# Patient Record
Sex: FEMALE | Race: WHITE | NOT HISPANIC OR LATINO | URBAN - METROPOLITAN AREA
[De-identification: names, ages, dates, MRNs, and addresses within clinical notes are randomized per-mention and may not be internally consistent; named-entity substitution may affect disease eponyms.]

---

## 2019-03-27 ENCOUNTER — EMERGENCY (EMERGENCY)
Facility: HOSPITAL | Age: 74
LOS: 1 days | Discharge: HOME | End: 2019-03-27
Attending: EMERGENCY MEDICINE | Admitting: EMERGENCY MEDICINE
Payer: COMMERCIAL

## 2019-03-27 VITALS
SYSTOLIC BLOOD PRESSURE: 120 MMHG | RESPIRATION RATE: 18 BRPM | OXYGEN SATURATION: 97 % | DIASTOLIC BLOOD PRESSURE: 60 MMHG | HEART RATE: 72 BPM | TEMPERATURE: 97 F

## 2019-03-27 VITALS
HEIGHT: 65 IN | DIASTOLIC BLOOD PRESSURE: 57 MMHG | SYSTOLIC BLOOD PRESSURE: 149 MMHG | WEIGHT: 212.08 LBS | RESPIRATION RATE: 16 BRPM | HEART RATE: 72 BPM | OXYGEN SATURATION: 98 % | TEMPERATURE: 97 F

## 2019-03-27 LAB
ALBUMIN SERPL ELPH-MCNC: 2.9 G/DL — LOW (ref 3.3–5)
ALP SERPL-CCNC: 62 U/L — SIGNIFICANT CHANGE UP (ref 30–120)
ALT FLD-CCNC: 29 U/L DA — SIGNIFICANT CHANGE UP (ref 10–60)
ANION GAP SERPL CALC-SCNC: 9 MMOL/L — SIGNIFICANT CHANGE UP (ref 5–17)
APTT BLD: 29.1 SEC — SIGNIFICANT CHANGE UP (ref 28.5–37)
AST SERPL-CCNC: 22 U/L — SIGNIFICANT CHANGE UP (ref 10–40)
BASOPHILS # BLD AUTO: 0.02 K/UL — SIGNIFICANT CHANGE UP (ref 0–0.2)
BASOPHILS NFR BLD AUTO: 0.3 % — SIGNIFICANT CHANGE UP (ref 0–2)
BILIRUB SERPL-MCNC: 0.4 MG/DL — SIGNIFICANT CHANGE UP (ref 0.2–1.2)
BUN SERPL-MCNC: 35 MG/DL — HIGH (ref 7–23)
CALCIUM SERPL-MCNC: 8.3 MG/DL — LOW (ref 8.4–10.5)
CHLORIDE SERPL-SCNC: 99 MMOL/L — SIGNIFICANT CHANGE UP (ref 96–108)
CK MB BLD-MCNC: 5.4 % — HIGH (ref 0–3.5)
CK MB CFR SERPL CALC: 6 NG/ML — HIGH (ref 0–3.6)
CK SERPL-CCNC: 111 U/L — SIGNIFICANT CHANGE UP (ref 26–192)
CO2 SERPL-SCNC: 30 MMOL/L — SIGNIFICANT CHANGE UP (ref 22–31)
CREAT SERPL-MCNC: 1.59 MG/DL — HIGH (ref 0.5–1.3)
EOSINOPHIL # BLD AUTO: 0.08 K/UL — SIGNIFICANT CHANGE UP (ref 0–0.5)
EOSINOPHIL NFR BLD AUTO: 1.2 % — SIGNIFICANT CHANGE UP (ref 0–6)
GLUCOSE BLDC GLUCOMTR-MCNC: 135 MG/DL — HIGH (ref 70–99)
GLUCOSE SERPL-MCNC: 169 MG/DL — HIGH (ref 70–99)
HCT VFR BLD CALC: 33.6 % — LOW (ref 34.5–45)
HGB BLD-MCNC: 10.9 G/DL — LOW (ref 11.5–15.5)
IMM GRANULOCYTES NFR BLD AUTO: 1.3 % — SIGNIFICANT CHANGE UP (ref 0–1.5)
INR BLD: 0.97 RATIO — SIGNIFICANT CHANGE UP (ref 0.88–1.16)
LYMPHOCYTES # BLD AUTO: 0.59 K/UL — LOW (ref 1–3.3)
LYMPHOCYTES # BLD AUTO: 8.8 % — LOW (ref 13–44)
MCHC RBC-ENTMCNC: 31.1 PG — SIGNIFICANT CHANGE UP (ref 27–34)
MCHC RBC-ENTMCNC: 32.4 GM/DL — SIGNIFICANT CHANGE UP (ref 32–36)
MCV RBC AUTO: 96 FL — SIGNIFICANT CHANGE UP (ref 80–100)
MONOCYTES # BLD AUTO: 0.71 K/UL — SIGNIFICANT CHANGE UP (ref 0–0.9)
MONOCYTES NFR BLD AUTO: 10.6 % — SIGNIFICANT CHANGE UP (ref 2–14)
NEUTROPHILS # BLD AUTO: 5.18 K/UL — SIGNIFICANT CHANGE UP (ref 1.8–7.4)
NEUTROPHILS NFR BLD AUTO: 77.8 % — HIGH (ref 43–77)
NRBC # BLD: 0 /100 WBCS — SIGNIFICANT CHANGE UP (ref 0–0)
PLATELET # BLD AUTO: 173 K/UL — SIGNIFICANT CHANGE UP (ref 150–400)
POTASSIUM SERPL-MCNC: 3.6 MMOL/L — SIGNIFICANT CHANGE UP (ref 3.5–5.3)
POTASSIUM SERPL-SCNC: 3.6 MMOL/L — SIGNIFICANT CHANGE UP (ref 3.5–5.3)
PROT SERPL-MCNC: 6.1 G/DL — SIGNIFICANT CHANGE UP (ref 6–8.3)
PROTHROM AB SERPL-ACNC: 10.6 SEC — SIGNIFICANT CHANGE UP (ref 10–12.9)
RBC # BLD: 3.5 M/UL — LOW (ref 3.8–5.2)
RBC # FLD: 14.5 % — SIGNIFICANT CHANGE UP (ref 10.3–14.5)
SODIUM SERPL-SCNC: 138 MMOL/L — SIGNIFICANT CHANGE UP (ref 135–145)
TROPONIN I SERPL-MCNC: 0.02 NG/ML — SIGNIFICANT CHANGE UP (ref 0.02–0.06)
WBC # BLD: 6.67 K/UL — SIGNIFICANT CHANGE UP (ref 3.8–10.5)
WBC # FLD AUTO: 6.67 K/UL — SIGNIFICANT CHANGE UP (ref 3.8–10.5)

## 2019-03-27 PROCEDURE — 71045 X-RAY EXAM CHEST 1 VIEW: CPT

## 2019-03-27 PROCEDURE — 70450 CT HEAD/BRAIN W/O DYE: CPT

## 2019-03-27 PROCEDURE — 93010 ELECTROCARDIOGRAM REPORT: CPT

## 2019-03-27 PROCEDURE — 80053 COMPREHEN METABOLIC PANEL: CPT

## 2019-03-27 PROCEDURE — 85027 COMPLETE CBC AUTOMATED: CPT

## 2019-03-27 PROCEDURE — 71045 X-RAY EXAM CHEST 1 VIEW: CPT | Mod: 26

## 2019-03-27 PROCEDURE — 93005 ELECTROCARDIOGRAM TRACING: CPT

## 2019-03-27 PROCEDURE — 85610 PROTHROMBIN TIME: CPT

## 2019-03-27 PROCEDURE — 96374 THER/PROPH/DIAG INJ IV PUSH: CPT

## 2019-03-27 PROCEDURE — 85730 THROMBOPLASTIN TIME PARTIAL: CPT

## 2019-03-27 PROCEDURE — 82962 GLUCOSE BLOOD TEST: CPT

## 2019-03-27 PROCEDURE — 99284 EMERGENCY DEPT VISIT MOD MDM: CPT | Mod: 25

## 2019-03-27 PROCEDURE — 70450 CT HEAD/BRAIN W/O DYE: CPT | Mod: 26

## 2019-03-27 PROCEDURE — 82550 ASSAY OF CK (CPK): CPT

## 2019-03-27 PROCEDURE — 84484 ASSAY OF TROPONIN QUANT: CPT

## 2019-03-27 PROCEDURE — 99284 EMERGENCY DEPT VISIT MOD MDM: CPT

## 2019-03-27 PROCEDURE — 36415 COLL VENOUS BLD VENIPUNCTURE: CPT

## 2019-03-27 PROCEDURE — 82553 CREATINE MB FRACTION: CPT

## 2019-03-27 RX ORDER — ONDANSETRON 8 MG/1
4 TABLET, FILM COATED ORAL ONCE
Qty: 0 | Refills: 0 | Status: COMPLETED | OUTPATIENT
Start: 2019-03-27 | End: 2019-03-27

## 2019-03-27 RX ADMIN — ONDANSETRON 4 MILLIGRAM(S): 8 TABLET, FILM COATED ORAL at 14:05

## 2019-03-27 NOTE — CONSULT NOTE ADULT - PROBLEM SELECTOR RECOMMENDATION 9
Likely secondary to excess fluid removal post-HD  Routine labs  Cardiac enzymes  CT head  Check orthostatics  Cardio consult  Further work-up/management pending clinical course.

## 2019-03-27 NOTE — CONSULT NOTE ADULT - ASSESSMENT
The patient is a 73 year old female with a history of HTN, DM, ESRD on HD who presents with syncope.    Plan:  - Syncope most likely secondary to hypotension from fluid removal during HD  - Patient had received some fluids back at dialysis  - Check orthostatics  - If orthostatics positive, give an additional 250 cc back  - Check basic labs  - Check one set of cardiac enzymes  - CT head ordered

## 2019-03-27 NOTE — ED PROVIDER NOTE - PROGRESS NOTE DETAILS
shala (cards) seen eval pt, cleared for d/c and outpt f/u maximo (neuro) seen eval pt, cleared for d/c and outpt f/u. Reevaluated patient at bedside.  Patient feeling much improved.  Discussed the results of all diagnostic testing in ED and copies of all reports given.   An opportunity to ask questions was given.  Discussed the importance of prompt, close medical follow-up.  Patient will return with any changes, concerns or persistent / worsening symptoms.  Understanding of all instructions verbalized.

## 2019-03-27 NOTE — CONSULT NOTE ADULT - ATTENDING COMMENTS
Advanced care planning discussed with patient/family. Advanced care planning forms reviewed/discussed/completed. 20 minutes spent.

## 2019-03-27 NOTE — ED PROVIDER NOTE - OBJECTIVE STATEMENT
74 y/o F pt w/ PMHx HTN, DM presents to the ED BIBA c/o dizziness, nausea, syncope x 45 minutes. Pt states she was sitting when she became dizzy and passed out while at her dialysis session. Reports that she feels nauseous and describes the dizziness feeling as "tired", denies vomiting. Pt denies abd pain, headache, visual changes, fever, CP, SOB or any other complaints at this time.  Dr. Mendez - nephrologist ([facility])  Dr. Bolden - cardiologist ([facility])  Dr. Bowers - PMD ([facility]) 72 y/o F pt w/ PMHx HTN, DM presents to the ED BIBA c/o dizziness, nausea, syncope x 45 minutes ago. Pt states she was sitting when she became dizzy and passed out while at her dialysis session. Reports that she feels nauseous and describes the dizziness feeling as "tired", denies vomiting. Pt denies abd pain, headache, visual changes, fever, CP, SOB or any other complaints at this time.  Dr. Mendez - nephrologist ([facility])  Dr. Bolden - cardiologist ([facility])  Dr. Bowers - PMD ([facility]) 74 y/o F pt w/ PMHx HTN, DM presents to the ED BIBA c/o dizziness, nausea, syncope x 45 minutes ago. Pt states she was sitting when she became dizzy and passed out while at her dialysis session. Reports that she feels nauseous and describes the dizziness feeling as "tired", denies vomiting. Pt denies abd pain, headache, visual changes, fever, CP, SOB or any other complaints at this time.  Dr. Mendez - nephrologist ([facility])  Dr. Bolden - cardiologist ([facility])  Dr. Bowers - PMD ([facility])  Dr. Fernandez - neuro ([place])

## 2019-03-27 NOTE — CONSULT NOTE ADULT - CONSULT REASON
Patient is being seen and examined in the Emergency Room for possible admission to the hospital. Patient has come with syncope.

## 2019-03-28 DIAGNOSIS — I10 ESSENTIAL (PRIMARY) HYPERTENSION: ICD-10-CM

## 2019-03-28 DIAGNOSIS — R55 SYNCOPE AND COLLAPSE: ICD-10-CM

## 2019-03-28 DIAGNOSIS — N18.6 END STAGE RENAL DISEASE: ICD-10-CM

## 2019-03-28 RX ORDER — LABETALOL HCL 100 MG
0 TABLET ORAL
Qty: 0 | Refills: 0 | COMMUNITY

## 2019-03-28 RX ORDER — AMOXICILLIN 250 MG/5ML
1 SUSPENSION, RECONSTITUTED, ORAL (ML) ORAL
Qty: 0 | Refills: 0 | COMMUNITY

## 2019-03-29 DIAGNOSIS — R55 SYNCOPE AND COLLAPSE: ICD-10-CM

## 2022-04-23 NOTE — CONSULT NOTE ADULT - SUBJECTIVE AND OBJECTIVE BOX
HPI: The patient is a 73 year old female with a history of HTN, DM, ESRD on HD who presents with syncope. She was undergoing dialysis when she started to feel lightheaded, visual changes. She did not say anything because she wanted to finish her session. After session was completed, she lost consciousness briefly. She continues to feel a bit lightheaded and blurry vision with some nausea. No chest pain, palpitations, shortness of breath. She has had near syncope in the past during dialysis when too much fluid is removed.    Dr. Mendez - nephrologist ([facility])  	Dr. Bolden - cardiologist ([facility])  	Dr. Bowers - PMD ([facility])  Dr. Fernandez - neuro ([place])      PAST MEDICAL & SURGICAL HISTORY:  Hypertension  ESRD (end stage renal disease) on dialysis      REVIEW OF SYSTEMS:    CONSTITUTIONAL: No fever, no weight loss, no fatigue  EYES: No eye pain, no visual disturbances  ENMT:  No difficulty hearing, no tinnitus, no vertigo; No sinus or throat pain  NECK: No pain or stiffness  RESPIRATORY: No cough, no wheezing, no chills, no hemoptysis; No shortness of breath  CARDIOVASCULAR: No chest pain, palpitations, dizziness, or leg swelling  GASTROINTESTINAL: No abdominal pain. No nausea, no vomiting, no hematemesis; No diarrhea, no constipation. No melena, no hematochezia.  GENITOURINARY: No dysuria, no frequency, no hematuria, no incontinence  NEUROLOGICAL: No headaches, no memory loss, no loss of strength, no numbness, no tremors  SKIN: No itching, no burning, no rashes   LYMPH NODES: No enlarged glands  ENDOCRINE: No heat, no cold intolerance; No hair loss  MUSCULOSKELETAL: No joint pain, no swelling; No muscle pain, no back pain, no extremity pain  PSYCHIATRIC: No depression, no anxiety, no mood swings, no difficulty sleeping  HEME/LYMPH: No easy bruising, no bleeding gums  ALLERGY AND IMMUNOLOGIC: No hives, no eczema      MEDICATIONS  (STANDING):  calcitriol  labetalol  protonix  renagel  spironolactone    MEDICATIONS  (PRN): denies      Allergies    sulfa drugs (Rash)    Intolerances        SOCIAL HISTORY: no etoh, no smoking    FAMILY HISTORY: no pertinent medical history in first degree relatives      Vital Signs Last 24 Hrs  T(C): 36.3 (27 Mar 2019 16:57), Max: 36.3 (27 Mar 2019 13:39)  T(F): 97.4 (27 Mar 2019 16:57), Max: 97.4 (27 Mar 2019 13:39)  HR: 72 (27 Mar 2019 16:57) (72 - 72)  BP: 120/60 (27 Mar 2019 16:57) (120/60 - 149/57)  BP(mean): --  RR: 18 (27 Mar 2019 16:57) (16 - 18)  SpO2: 97% (27 Mar 2019 16:57) (97% - 98%)    PHYSICAL EXAM:    GENERAL: NAD  HEAD:  Atraumatic, Normocephalic  EYES: EOMI, PERRLA, conjunctiva and sclera clear  ENMT: No tonsillar erythema, exudates, or enlargement; Moist mucous membranes  NECK: Supple, No JVD, Normal thyroid  NERVOUS SYSTEM:  Alert & Oriented X3, Motor Strength 5/5 B/L upper and lower extremities;  CHEST/LUNG: Clear to percussion bilaterally; No rales, rhonchi, wheezing, or rubs  HEART: Regular rate and rhythm; No murmurs, rubs, or gallops  ABDOMEN: Soft, Nontender, Nondistended; Bowel sounds present  EXTREMITIES:  2+ Peripheral Pulses, No clubbing, cyanosis, or edema  LYMPH: No lymphadenopathy   SKIN: No rashes or lesions      LABS:                        10.9   6.67  )-----------( 173      ( 27 Mar 2019 14:11 )             33.6     03-27    138  |  99  |  35<H>  ----------------------------<  169<H>  3.6   |  30  |  1.59<H>    Ca    8.3<L>      27 Mar 2019 14:11    TPro  6.1  /  Alb  2.9<L>  /  TBili  0.4  /  DBili  x   /  AST  22  /  ALT  29  /  AlkPhos  62  03-27    PT/INR - ( 27 Mar 2019 14:11 )   PT: 10.6 sec;   INR: 0.97 ratio         PTT - ( 27 Mar 2019 14:11 )  PTT:29.1 sec      RADIOLOGY & ADDITIONAL STUDIES:
History of Present Illness: The patient is a 73 year old female with a history of HTN, DM, ESRD on HD who presents with syncope. She was undergoing dialysis when she started to feel lightheaded, visual changes. She did not say anything because she wanted to finish her session. After session was completed, she lost consciousness briefly. She continues to feel a bit lightheaded and blurry vision with some nausea. No chest pain, palpitations, shortness of breath. She has had near syncope in the past during dialysis when too much fluid is removed.    Past Medical/Surgical History:  HTN, DM, ESRD on HD    Medications:  Home Medications:      Family History: Non-contributory family history of premature cardiovascular atherosclerotic disease    Social History: No tobacco, alcohol or drug use    Review of Systems:  General: No fevers, chills, weight loss or gain  Skin: No rashes, color changes  Cardiovascular: No chest pain, orthopnea  Respiratory: No shortness of breath, cough  Gastrointestinal: No nausea, abdominal pain  Genitourinary: No incontinence, pain with urination  Musculoskeletal: No pain, swelling, decreased range of motion  Neurological: No headache, weakness  Psychiatric: No depression, anxiety  Endocrine: No weight loss or gain, increased thirst  All other systems are comprehensively negative.    Physical Exam:  Vitals:        Vital Signs Last 24 Hrs  T(C): 36.3 (27 Mar 2019 13:39), Max: 36.3 (27 Mar 2019 13:39)  T(F): 97.4 (27 Mar 2019 13:39), Max: 97.4 (27 Mar 2019 13:39)  HR: 72 (27 Mar 2019 13:39) (72 - 72)  BP: 149/57 (27 Mar 2019 13:39) (149/57 - 149/57)  BP(mean): --  RR: 16 (27 Mar 2019 13:39) (16 - 16)  SpO2: 98% (27 Mar 2019 13:39) (98% - 98%)  General: NAD  HEENT: MMM  Neck: No JVD, no carotid bruit  Lungs: CTAB  CV: RRR, nl S1/S2, no M/R/G  Abdomen: S/NT/ND, +BS  Extremities: No LE edema, no cyanosis  Neuro: AAOx3, non-focal  Skin: No rash    Labs:                  ECG: NSR, normal axis, borderline LVH with secondary repol abnormality
English

## 2023-01-01 ENCOUNTER — TRANSCRIPTION ENCOUNTER (OUTPATIENT)
Age: 78
End: 2023-01-01

## 2023-01-01 ENCOUNTER — EMERGENCY (EMERGENCY)
Facility: HOSPITAL | Age: 78
LOS: 1 days | Discharge: HOME | End: 2023-01-01
Attending: EMERGENCY MEDICINE | Admitting: EMERGENCY MEDICINE
Payer: MEDICARE

## 2023-01-01 ENCOUNTER — INPATIENT (INPATIENT)
Facility: HOSPITAL | Age: 78
LOS: 3 days | Discharge: HOME | DRG: 393 | End: 2023-10-12
Attending: INTERNAL MEDICINE | Admitting: FAMILY MEDICINE
Payer: MEDICARE

## 2023-01-01 ENCOUNTER — INPATIENT (INPATIENT)
Facility: HOSPITAL | Age: 78
LOS: 0 days | Discharge: HOME | DRG: 393 | End: 2023-10-06
Attending: STUDENT IN AN ORGANIZED HEALTH CARE EDUCATION/TRAINING PROGRAM | Admitting: STUDENT IN AN ORGANIZED HEALTH CARE EDUCATION/TRAINING PROGRAM
Payer: COMMERCIAL

## 2023-01-01 ENCOUNTER — EMERGENCY (EMERGENCY)
Facility: HOSPITAL | Age: 78
LOS: 1 days | Discharge: HOME | End: 2023-01-01
Attending: STUDENT IN AN ORGANIZED HEALTH CARE EDUCATION/TRAINING PROGRAM | Admitting: STUDENT IN AN ORGANIZED HEALTH CARE EDUCATION/TRAINING PROGRAM
Payer: MEDICARE

## 2023-01-01 ENCOUNTER — EMERGENCY (EMERGENCY)
Facility: HOSPITAL | Age: 78
LOS: 1 days | Discharge: HOME | End: 2023-01-01
Attending: STUDENT IN AN ORGANIZED HEALTH CARE EDUCATION/TRAINING PROGRAM | Admitting: STUDENT IN AN ORGANIZED HEALTH CARE EDUCATION/TRAINING PROGRAM
Payer: COMMERCIAL

## 2023-01-01 VITALS
SYSTOLIC BLOOD PRESSURE: 154 MMHG | TEMPERATURE: 98 F | OXYGEN SATURATION: 97 % | WEIGHT: 196.21 LBS | RESPIRATION RATE: 16 BRPM | DIASTOLIC BLOOD PRESSURE: 85 MMHG | HEART RATE: 71 BPM

## 2023-01-01 VITALS
HEIGHT: 63 IN | DIASTOLIC BLOOD PRESSURE: 62 MMHG | HEART RATE: 94 BPM | SYSTOLIC BLOOD PRESSURE: 110 MMHG | WEIGHT: 250 LBS | RESPIRATION RATE: 18 BRPM | TEMPERATURE: 97 F | OXYGEN SATURATION: 100 %

## 2023-01-01 VITALS
HEIGHT: 65 IN | TEMPERATURE: 98 F | RESPIRATION RATE: 16 BRPM | WEIGHT: 197.98 LBS | DIASTOLIC BLOOD PRESSURE: 89 MMHG | SYSTOLIC BLOOD PRESSURE: 180 MMHG | HEART RATE: 100 BPM | OXYGEN SATURATION: 100 %

## 2023-01-01 VITALS
DIASTOLIC BLOOD PRESSURE: 89 MMHG | SYSTOLIC BLOOD PRESSURE: 162 MMHG | OXYGEN SATURATION: 96 % | HEART RATE: 94 BPM | RESPIRATION RATE: 16 BRPM

## 2023-01-01 VITALS
OXYGEN SATURATION: 94 % | HEART RATE: 95 BPM | TEMPERATURE: 98 F | RESPIRATION RATE: 16 BRPM | HEIGHT: 65 IN | SYSTOLIC BLOOD PRESSURE: 176 MMHG | DIASTOLIC BLOOD PRESSURE: 82 MMHG | WEIGHT: 197.98 LBS

## 2023-01-01 VITALS
TEMPERATURE: 98 F | OXYGEN SATURATION: 98 % | RESPIRATION RATE: 18 BRPM | DIASTOLIC BLOOD PRESSURE: 80 MMHG | HEART RATE: 79 BPM | SYSTOLIC BLOOD PRESSURE: 175 MMHG

## 2023-01-01 VITALS
TEMPERATURE: 98 F | RESPIRATION RATE: 16 BRPM | HEIGHT: 65 IN | DIASTOLIC BLOOD PRESSURE: 80 MMHG | WEIGHT: 199.96 LBS | HEART RATE: 78 BPM | OXYGEN SATURATION: 96 % | SYSTOLIC BLOOD PRESSURE: 134 MMHG

## 2023-01-01 VITALS
RESPIRATION RATE: 19 BRPM | TEMPERATURE: 97 F | DIASTOLIC BLOOD PRESSURE: 98 MMHG | OXYGEN SATURATION: 95 % | HEIGHT: 65 IN | WEIGHT: 197.98 LBS | HEART RATE: 92 BPM | SYSTOLIC BLOOD PRESSURE: 137 MMHG

## 2023-01-01 VITALS
TEMPERATURE: 98 F | SYSTOLIC BLOOD PRESSURE: 109 MMHG | HEART RATE: 78 BPM | OXYGEN SATURATION: 98 % | DIASTOLIC BLOOD PRESSURE: 69 MMHG | RESPIRATION RATE: 18 BRPM | WEIGHT: 207.01 LBS | HEIGHT: 65 IN

## 2023-01-01 VITALS
OXYGEN SATURATION: 97 % | HEART RATE: 79 BPM | SYSTOLIC BLOOD PRESSURE: 140 MMHG | TEMPERATURE: 99 F | DIASTOLIC BLOOD PRESSURE: 51 MMHG | RESPIRATION RATE: 20 BRPM

## 2023-01-01 DIAGNOSIS — Z93.1 GASTROSTOMY STATUS: Chronic | ICD-10-CM

## 2023-01-01 DIAGNOSIS — Z98.890 OTHER SPECIFIED POSTPROCEDURAL STATES: Chronic | ICD-10-CM

## 2023-01-01 DIAGNOSIS — K94.23 GASTROSTOMY MALFUNCTION: ICD-10-CM

## 2023-01-01 DIAGNOSIS — Z90.49 ACQUIRED ABSENCE OF OTHER SPECIFIED PARTS OF DIGESTIVE TRACT: Chronic | ICD-10-CM

## 2023-01-01 DIAGNOSIS — S99.922A UNSPECIFIED INJURY OF LEFT FOOT, INITIAL ENCOUNTER: ICD-10-CM

## 2023-01-01 DIAGNOSIS — I10 ESSENTIAL (PRIMARY) HYPERTENSION: ICD-10-CM

## 2023-01-01 DIAGNOSIS — Z01.818 ENCOUNTER FOR OTHER PREPROCEDURAL EXAMINATION: ICD-10-CM

## 2023-01-01 DIAGNOSIS — G30.9 ALZHEIMER'S DISEASE, UNSPECIFIED: ICD-10-CM

## 2023-01-01 DIAGNOSIS — N18.6 END STAGE RENAL DISEASE: ICD-10-CM

## 2023-01-01 DIAGNOSIS — E78.5 HYPERLIPIDEMIA, UNSPECIFIED: ICD-10-CM

## 2023-01-01 DIAGNOSIS — T85.528A DISPLACEMENT OF OTHER GASTROINTESTINAL PROSTHETIC DEVICES, IMPLANTS AND GRAFTS, INITIAL ENCOUNTER: ICD-10-CM

## 2023-01-01 DIAGNOSIS — E11.9 TYPE 2 DIABETES MELLITUS WITHOUT COMPLICATIONS: ICD-10-CM

## 2023-01-01 DIAGNOSIS — Z29.9 ENCOUNTER FOR PROPHYLACTIC MEASURES, UNSPECIFIED: ICD-10-CM

## 2023-01-01 LAB
A1C WITH ESTIMATED AVERAGE GLUCOSE RESULT: 5.8 % — HIGH (ref 4–5.6)
ALBUMIN SERPL ELPH-MCNC: 2.8 G/DL — LOW (ref 3.3–5)
ALBUMIN SERPL ELPH-MCNC: 2.9 G/DL — LOW (ref 3.3–5)
ALBUMIN SERPL ELPH-MCNC: 3 G/DL — LOW (ref 3.3–5)
ALBUMIN SERPL ELPH-MCNC: 3.3 G/DL — SIGNIFICANT CHANGE UP (ref 3.3–5)
ALP SERPL-CCNC: 59 U/L — SIGNIFICANT CHANGE UP (ref 40–120)
ALP SERPL-CCNC: 61 U/L — SIGNIFICANT CHANGE UP (ref 40–120)
ALP SERPL-CCNC: 66 U/L — SIGNIFICANT CHANGE UP (ref 40–120)
ALT FLD-CCNC: 10 U/L — SIGNIFICANT CHANGE UP (ref 10–45)
ALT FLD-CCNC: 11 U/L — SIGNIFICANT CHANGE UP (ref 10–45)
ALT FLD-CCNC: 13 U/L — SIGNIFICANT CHANGE UP (ref 10–45)
ALT FLD-CCNC: 15 U/L — SIGNIFICANT CHANGE UP (ref 12–78)
ANION GAP SERPL CALC-SCNC: 10 MMOL/L — SIGNIFICANT CHANGE UP (ref 5–17)
ANION GAP SERPL CALC-SCNC: 11 MMOL/L — SIGNIFICANT CHANGE UP (ref 5–17)
ANION GAP SERPL CALC-SCNC: 5 MMOL/L — SIGNIFICANT CHANGE UP (ref 5–17)
ANION GAP SERPL CALC-SCNC: 7 MMOL/L — SIGNIFICANT CHANGE UP (ref 5–17)
ANION GAP SERPL CALC-SCNC: 8 MMOL/L — SIGNIFICANT CHANGE UP (ref 5–17)
ANION GAP SERPL CALC-SCNC: 9 MMOL/L — SIGNIFICANT CHANGE UP (ref 5–17)
ANION GAP SERPL CALC-SCNC: 9 MMOL/L — SIGNIFICANT CHANGE UP (ref 5–17)
APTT BLD: 25.7 SEC — SIGNIFICANT CHANGE UP (ref 24.5–35.6)
APTT BLD: 32.2 SEC — SIGNIFICANT CHANGE UP (ref 24.5–35.6)
APTT BLD: 33.4 SEC — SIGNIFICANT CHANGE UP (ref 24.5–35.6)
AST SERPL-CCNC: 11 U/L — SIGNIFICANT CHANGE UP (ref 10–40)
AST SERPL-CCNC: 12 U/L — LOW (ref 15–37)
AST SERPL-CCNC: 17 U/L — SIGNIFICANT CHANGE UP (ref 10–40)
AST SERPL-CCNC: 9 U/L — LOW (ref 10–40)
BASOPHILS # BLD AUTO: 0.02 K/UL — SIGNIFICANT CHANGE UP (ref 0–0.2)
BASOPHILS # BLD AUTO: 0.02 K/UL — SIGNIFICANT CHANGE UP (ref 0–0.2)
BASOPHILS # BLD AUTO: 0.04 K/UL — SIGNIFICANT CHANGE UP (ref 0–0.2)
BASOPHILS NFR BLD AUTO: 0.2 % — SIGNIFICANT CHANGE UP (ref 0–2)
BASOPHILS NFR BLD AUTO: 0.2 % — SIGNIFICANT CHANGE UP (ref 0–2)
BASOPHILS NFR BLD AUTO: 0.3 % — SIGNIFICANT CHANGE UP (ref 0–2)
BILIRUB SERPL-MCNC: 0.2 MG/DL — SIGNIFICANT CHANGE UP (ref 0.2–1.2)
BILIRUB SERPL-MCNC: 0.4 MG/DL — SIGNIFICANT CHANGE UP (ref 0.2–1.2)
BLD GP AB SCN SERPL QL: SIGNIFICANT CHANGE UP
BUN SERPL-MCNC: 35 MG/DL — HIGH (ref 7–23)
BUN SERPL-MCNC: 38 MG/DL — HIGH (ref 7–23)
BUN SERPL-MCNC: 41 MG/DL — HIGH (ref 7–23)
BUN SERPL-MCNC: 43 MG/DL — HIGH (ref 7–23)
BUN SERPL-MCNC: 55 MG/DL — HIGH (ref 7–23)
BUN SERPL-MCNC: 67 MG/DL — HIGH (ref 7–23)
BUN SERPL-MCNC: 72 MG/DL — HIGH (ref 7–23)
CALCIUM SERPL-MCNC: 10 MG/DL — SIGNIFICANT CHANGE UP (ref 8.4–10.5)
CALCIUM SERPL-MCNC: 8.9 MG/DL — SIGNIFICANT CHANGE UP (ref 8.4–10.5)
CALCIUM SERPL-MCNC: 9.1 MG/DL — SIGNIFICANT CHANGE UP (ref 8.4–10.5)
CALCIUM SERPL-MCNC: 9.1 MG/DL — SIGNIFICANT CHANGE UP (ref 8.4–10.5)
CALCIUM SERPL-MCNC: 9.3 MG/DL — SIGNIFICANT CHANGE UP (ref 8.4–10.5)
CALCIUM SERPL-MCNC: 9.3 MG/DL — SIGNIFICANT CHANGE UP (ref 8.5–10.1)
CALCIUM SERPL-MCNC: 9.5 MG/DL — SIGNIFICANT CHANGE UP (ref 8.4–10.5)
CHLORIDE SERPL-SCNC: 100 MMOL/L — SIGNIFICANT CHANGE UP (ref 96–108)
CHLORIDE SERPL-SCNC: 100 MMOL/L — SIGNIFICANT CHANGE UP (ref 96–108)
CHLORIDE SERPL-SCNC: 101 MMOL/L — SIGNIFICANT CHANGE UP (ref 96–108)
CHLORIDE SERPL-SCNC: 95 MMOL/L — LOW (ref 96–108)
CHLORIDE SERPL-SCNC: 95 MMOL/L — LOW (ref 96–108)
CHLORIDE SERPL-SCNC: 96 MMOL/L — SIGNIFICANT CHANGE UP (ref 96–108)
CHLORIDE SERPL-SCNC: 98 MMOL/L — SIGNIFICANT CHANGE UP (ref 96–108)
CO2 SERPL-SCNC: 28 MMOL/L — SIGNIFICANT CHANGE UP (ref 22–31)
CO2 SERPL-SCNC: 30 MMOL/L — SIGNIFICANT CHANGE UP (ref 22–31)
CO2 SERPL-SCNC: 31 MMOL/L — SIGNIFICANT CHANGE UP (ref 22–31)
CO2 SERPL-SCNC: 31 MMOL/L — SIGNIFICANT CHANGE UP (ref 22–31)
CO2 SERPL-SCNC: 35 MMOL/L — HIGH (ref 22–31)
CREAT SERPL-MCNC: 1.7 MG/DL — HIGH (ref 0.5–1.3)
CREAT SERPL-MCNC: 1.86 MG/DL — HIGH (ref 0.5–1.3)
CREAT SERPL-MCNC: 2.05 MG/DL — HIGH (ref 0.5–1.3)
CREAT SERPL-MCNC: 2.2 MG/DL — HIGH (ref 0.5–1.3)
CREAT SERPL-MCNC: 2.4 MG/DL — HIGH (ref 0.5–1.3)
CREAT SERPL-MCNC: 2.76 MG/DL — HIGH (ref 0.5–1.3)
CREAT SERPL-MCNC: 2.83 MG/DL — HIGH (ref 0.5–1.3)
CULTURE RESULTS: SIGNIFICANT CHANGE UP
CULTURE RESULTS: SIGNIFICANT CHANGE UP
EGFR: 16 ML/MIN/1.73M2 — LOW
EGFR: 17 ML/MIN/1.73M2 — LOW
EGFR: 20 ML/MIN/1.73M2 — LOW
EGFR: 22 ML/MIN/1.73M2 — LOW
EGFR: 24 ML/MIN/1.73M2 — LOW
EGFR: 27 ML/MIN/1.73M2 — LOW
EGFR: 30 ML/MIN/1.73M2 — LOW
EOSINOPHIL # BLD AUTO: 0.07 K/UL — SIGNIFICANT CHANGE UP (ref 0–0.5)
EOSINOPHIL # BLD AUTO: 0.08 K/UL — SIGNIFICANT CHANGE UP (ref 0–0.5)
EOSINOPHIL # BLD AUTO: 0.11 K/UL — SIGNIFICANT CHANGE UP (ref 0–0.5)
EOSINOPHIL NFR BLD AUTO: 0.8 % — SIGNIFICANT CHANGE UP (ref 0–6)
EOSINOPHIL NFR BLD AUTO: 0.9 % — SIGNIFICANT CHANGE UP (ref 0–6)
EOSINOPHIL NFR BLD AUTO: 1 % — SIGNIFICANT CHANGE UP (ref 0–6)
ESTIMATED AVERAGE GLUCOSE: 120 MG/DL — HIGH (ref 68–114)
GLUCOSE BLDC GLUCOMTR-MCNC: 113 MG/DL — HIGH (ref 70–99)
GLUCOSE BLDC GLUCOMTR-MCNC: 127 MG/DL — HIGH (ref 70–99)
GLUCOSE BLDC GLUCOMTR-MCNC: 147 MG/DL — HIGH (ref 70–99)
GLUCOSE BLDC GLUCOMTR-MCNC: 184 MG/DL — HIGH (ref 70–99)
GLUCOSE BLDC GLUCOMTR-MCNC: 98 MG/DL — SIGNIFICANT CHANGE UP (ref 70–99)
GLUCOSE BLDC GLUCOMTR-MCNC: 99 MG/DL — SIGNIFICANT CHANGE UP (ref 70–99)
GLUCOSE SERPL-MCNC: 109 MG/DL — HIGH (ref 70–99)
GLUCOSE SERPL-MCNC: 111 MG/DL — HIGH (ref 70–99)
GLUCOSE SERPL-MCNC: 117 MG/DL — HIGH (ref 70–99)
GLUCOSE SERPL-MCNC: 132 MG/DL — HIGH (ref 70–99)
GLUCOSE SERPL-MCNC: 135 MG/DL — HIGH (ref 70–99)
GLUCOSE SERPL-MCNC: 138 MG/DL — HIGH (ref 70–99)
GLUCOSE SERPL-MCNC: 143 MG/DL — HIGH (ref 70–99)
HCT VFR BLD CALC: 29.9 % — LOW (ref 34.5–45)
HCT VFR BLD CALC: 30.1 % — LOW (ref 34.5–45)
HCT VFR BLD CALC: 30.1 % — LOW (ref 34.5–45)
HCT VFR BLD CALC: 31 % — LOW (ref 34.5–45)
HCT VFR BLD CALC: 33.8 % — LOW (ref 34.5–45)
HCT VFR BLD CALC: 34.6 % — SIGNIFICANT CHANGE UP (ref 34.5–45)
HCT VFR BLD CALC: 35.9 % — SIGNIFICANT CHANGE UP (ref 34.5–45)
HGB BLD-MCNC: 10 G/DL — LOW (ref 11.5–15.5)
HGB BLD-MCNC: 10.8 G/DL — LOW (ref 11.5–15.5)
HGB BLD-MCNC: 10.9 G/DL — LOW (ref 11.5–15.5)
HGB BLD-MCNC: 11.4 G/DL — LOW (ref 11.5–15.5)
HGB BLD-MCNC: 9.3 G/DL — LOW (ref 11.5–15.5)
HGB BLD-MCNC: 9.7 G/DL — LOW (ref 11.5–15.5)
HGB BLD-MCNC: 9.7 G/DL — LOW (ref 11.5–15.5)
IMM GRANULOCYTES NFR BLD AUTO: 0.6 % — SIGNIFICANT CHANGE UP (ref 0–0.9)
IMM GRANULOCYTES NFR BLD AUTO: 0.7 % — SIGNIFICANT CHANGE UP (ref 0–0.9)
IMM GRANULOCYTES NFR BLD AUTO: 0.8 % — SIGNIFICANT CHANGE UP (ref 0–0.9)
INR BLD: 0.89 RATIO — SIGNIFICANT CHANGE UP (ref 0.85–1.18)
INR BLD: 0.91 RATIO — SIGNIFICANT CHANGE UP (ref 0.85–1.18)
INR BLD: 0.92 RATIO — SIGNIFICANT CHANGE UP (ref 0.85–1.18)
LYMPHOCYTES # BLD AUTO: 0.31 K/UL — LOW (ref 1–3.3)
LYMPHOCYTES # BLD AUTO: 0.47 K/UL — LOW (ref 1–3.3)
LYMPHOCYTES # BLD AUTO: 0.5 K/UL — LOW (ref 1–3.3)
LYMPHOCYTES # BLD AUTO: 3.7 % — LOW (ref 13–44)
LYMPHOCYTES # BLD AUTO: 4.1 % — LOW (ref 13–44)
LYMPHOCYTES # BLD AUTO: 5.3 % — LOW (ref 13–44)
MCHC RBC-ENTMCNC: 29.9 PG — SIGNIFICANT CHANGE UP (ref 27–34)
MCHC RBC-ENTMCNC: 30.4 PG — SIGNIFICANT CHANGE UP (ref 27–34)
MCHC RBC-ENTMCNC: 30.8 PG — SIGNIFICANT CHANGE UP (ref 27–34)
MCHC RBC-ENTMCNC: 30.9 PG — SIGNIFICANT CHANGE UP (ref 27–34)
MCHC RBC-ENTMCNC: 31 PG — SIGNIFICANT CHANGE UP (ref 27–34)
MCHC RBC-ENTMCNC: 31.1 GM/DL — LOW (ref 32–36)
MCHC RBC-ENTMCNC: 31.2 PG — SIGNIFICANT CHANGE UP (ref 27–34)
MCHC RBC-ENTMCNC: 31.5 GM/DL — LOW (ref 32–36)
MCHC RBC-ENTMCNC: 31.8 GM/DL — LOW (ref 32–36)
MCHC RBC-ENTMCNC: 32 GM/DL — SIGNIFICANT CHANGE UP (ref 32–36)
MCHC RBC-ENTMCNC: 32.2 GM/DL — SIGNIFICANT CHANGE UP (ref 32–36)
MCHC RBC-ENTMCNC: 32.2 GM/DL — SIGNIFICANT CHANGE UP (ref 32–36)
MCHC RBC-ENTMCNC: 32.3 GM/DL — SIGNIFICANT CHANGE UP (ref 32–36)
MCV RBC AUTO: 95.4 FL — SIGNIFICANT CHANGE UP (ref 80–100)
MCV RBC AUTO: 95.7 FL — SIGNIFICANT CHANGE UP (ref 80–100)
MCV RBC AUTO: 96.1 FL — SIGNIFICANT CHANGE UP (ref 80–100)
MCV RBC AUTO: 96.2 FL — SIGNIFICANT CHANGE UP (ref 80–100)
MCV RBC AUTO: 96.6 FL — SIGNIFICANT CHANGE UP (ref 80–100)
MCV RBC AUTO: 96.8 FL — SIGNIFICANT CHANGE UP (ref 80–100)
MCV RBC AUTO: 98 FL — SIGNIFICANT CHANGE UP (ref 80–100)
MONOCYTES # BLD AUTO: 0.58 K/UL — SIGNIFICANT CHANGE UP (ref 0–0.9)
MONOCYTES # BLD AUTO: 0.81 K/UL — SIGNIFICANT CHANGE UP (ref 0–0.9)
MONOCYTES # BLD AUTO: 0.96 K/UL — HIGH (ref 0–0.9)
MONOCYTES NFR BLD AUTO: 7 % — SIGNIFICANT CHANGE UP (ref 2–14)
MONOCYTES NFR BLD AUTO: 7.9 % — SIGNIFICANT CHANGE UP (ref 2–14)
MONOCYTES NFR BLD AUTO: 9.1 % — SIGNIFICANT CHANGE UP (ref 2–14)
NEUTROPHILS # BLD AUTO: 10.52 K/UL — HIGH (ref 1.8–7.4)
NEUTROPHILS # BLD AUTO: 7.25 K/UL — SIGNIFICANT CHANGE UP (ref 1.8–7.4)
NEUTROPHILS # BLD AUTO: 7.47 K/UL — HIGH (ref 1.8–7.4)
NEUTROPHILS NFR BLD AUTO: 83.9 % — HIGH (ref 43–77)
NEUTROPHILS NFR BLD AUTO: 86.2 % — HIGH (ref 43–77)
NEUTROPHILS NFR BLD AUTO: 87.3 % — HIGH (ref 43–77)
NRBC # BLD: 0 /100 WBCS — SIGNIFICANT CHANGE UP (ref 0–0)
PLATELET # BLD AUTO: 151 K/UL — SIGNIFICANT CHANGE UP (ref 150–400)
PLATELET # BLD AUTO: 156 K/UL — SIGNIFICANT CHANGE UP (ref 150–400)
PLATELET # BLD AUTO: 158 K/UL — SIGNIFICANT CHANGE UP (ref 150–400)
PLATELET # BLD AUTO: 161 K/UL — SIGNIFICANT CHANGE UP (ref 150–400)
PLATELET # BLD AUTO: 171 K/UL — SIGNIFICANT CHANGE UP (ref 150–400)
PLATELET # BLD AUTO: 177 K/UL — SIGNIFICANT CHANGE UP (ref 150–400)
PLATELET # BLD AUTO: 188 K/UL — SIGNIFICANT CHANGE UP (ref 150–400)
POTASSIUM SERPL-MCNC: 3.4 MMOL/L — LOW (ref 3.5–5.3)
POTASSIUM SERPL-MCNC: 3.8 MMOL/L — SIGNIFICANT CHANGE UP (ref 3.5–5.3)
POTASSIUM SERPL-MCNC: 3.9 MMOL/L — SIGNIFICANT CHANGE UP (ref 3.5–5.3)
POTASSIUM SERPL-MCNC: 4.1 MMOL/L — SIGNIFICANT CHANGE UP (ref 3.5–5.3)
POTASSIUM SERPL-MCNC: 4.2 MMOL/L — SIGNIFICANT CHANGE UP (ref 3.5–5.3)
POTASSIUM SERPL-MCNC: 4.3 MMOL/L — SIGNIFICANT CHANGE UP (ref 3.5–5.3)
POTASSIUM SERPL-SCNC: 3.4 MMOL/L — LOW (ref 3.5–5.3)
POTASSIUM SERPL-SCNC: 3.8 MMOL/L — SIGNIFICANT CHANGE UP (ref 3.5–5.3)
POTASSIUM SERPL-SCNC: 3.9 MMOL/L — SIGNIFICANT CHANGE UP (ref 3.5–5.3)
POTASSIUM SERPL-SCNC: 4.1 MMOL/L — SIGNIFICANT CHANGE UP (ref 3.5–5.3)
POTASSIUM SERPL-SCNC: 4.2 MMOL/L — SIGNIFICANT CHANGE UP (ref 3.5–5.3)
POTASSIUM SERPL-SCNC: 4.3 MMOL/L — SIGNIFICANT CHANGE UP (ref 3.5–5.3)
PROT SERPL-MCNC: 6.6 G/DL — SIGNIFICANT CHANGE UP (ref 6–8.3)
PROT SERPL-MCNC: 6.8 G/DL — SIGNIFICANT CHANGE UP (ref 6–8.3)
PROT SERPL-MCNC: 7.2 G/DL — SIGNIFICANT CHANGE UP (ref 6–8.3)
PROT SERPL-MCNC: 7.4 G/DL — SIGNIFICANT CHANGE UP (ref 6–8.3)
PROTHROM AB SERPL-ACNC: 10.2 SEC — SIGNIFICANT CHANGE UP (ref 9.5–13)
PROTHROM AB SERPL-ACNC: 10.7 SEC — SIGNIFICANT CHANGE UP (ref 9.5–13)
PROTHROM AB SERPL-ACNC: 10.8 SEC — SIGNIFICANT CHANGE UP (ref 9.5–13)
RBC # BLD: 3.11 M/UL — LOW (ref 3.8–5.2)
RBC # BLD: 3.11 M/UL — LOW (ref 3.8–5.2)
RBC # BLD: 3.13 M/UL — LOW (ref 3.8–5.2)
RBC # BLD: 3.25 M/UL — LOW (ref 3.8–5.2)
RBC # BLD: 3.5 M/UL — LOW (ref 3.8–5.2)
RBC # BLD: 3.53 M/UL — LOW (ref 3.8–5.2)
RBC # BLD: 3.75 M/UL — LOW (ref 3.8–5.2)
RBC # FLD: 15.1 % — HIGH (ref 10.3–14.5)
RBC # FLD: 15.1 % — HIGH (ref 10.3–14.5)
RBC # FLD: 15.2 % — HIGH (ref 10.3–14.5)
RBC # FLD: 15.3 % — HIGH (ref 10.3–14.5)
RBC # FLD: 15.6 % — HIGH (ref 10.3–14.5)
RBC # FLD: 15.7 % — HIGH (ref 10.3–14.5)
RBC # FLD: 15.9 % — HIGH (ref 10.3–14.5)
SODIUM SERPL-SCNC: 135 MMOL/L — SIGNIFICANT CHANGE UP (ref 135–145)
SODIUM SERPL-SCNC: 135 MMOL/L — SIGNIFICANT CHANGE UP (ref 135–145)
SODIUM SERPL-SCNC: 136 MMOL/L — SIGNIFICANT CHANGE UP (ref 135–145)
SODIUM SERPL-SCNC: 138 MMOL/L — SIGNIFICANT CHANGE UP (ref 135–145)
SODIUM SERPL-SCNC: 139 MMOL/L — SIGNIFICANT CHANGE UP (ref 135–145)
SODIUM SERPL-SCNC: 140 MMOL/L — SIGNIFICANT CHANGE UP (ref 135–145)
SPECIMEN SOURCE: SIGNIFICANT CHANGE UP
SPECIMEN SOURCE: SIGNIFICANT CHANGE UP
TROPONIN I, HIGH SENSITIVITY RESULT: 26.1 NG/L — SIGNIFICANT CHANGE UP
WBC # BLD: 12.08 K/UL — HIGH (ref 3.8–10.5)
WBC # BLD: 6.54 K/UL — SIGNIFICANT CHANGE UP (ref 3.8–10.5)
WBC # BLD: 7.36 K/UL — SIGNIFICANT CHANGE UP (ref 3.8–10.5)
WBC # BLD: 8.31 K/UL — SIGNIFICANT CHANGE UP (ref 3.8–10.5)
WBC # BLD: 8.59 K/UL — SIGNIFICANT CHANGE UP (ref 3.8–10.5)
WBC # BLD: 8.9 K/UL — SIGNIFICANT CHANGE UP (ref 3.8–10.5)
WBC # FLD AUTO: 12.08 K/UL — HIGH (ref 3.8–10.5)
WBC # FLD AUTO: 6.54 K/UL — SIGNIFICANT CHANGE UP (ref 3.8–10.5)
WBC # FLD AUTO: 7.36 K/UL — SIGNIFICANT CHANGE UP (ref 3.8–10.5)
WBC # FLD AUTO: 8.31 K/UL — SIGNIFICANT CHANGE UP (ref 3.8–10.5)
WBC # FLD AUTO: 8.59 K/UL — SIGNIFICANT CHANGE UP (ref 3.8–10.5)
WBC # FLD AUTO: 8.9 K/UL — SIGNIFICANT CHANGE UP (ref 3.8–10.5)

## 2023-01-01 PROCEDURE — 73630 X-RAY EXAM OF FOOT: CPT | Mod: 26,LT

## 2023-01-01 PROCEDURE — 99261: CPT

## 2023-01-01 PROCEDURE — 73610 X-RAY EXAM OF ANKLE: CPT

## 2023-01-01 PROCEDURE — 93005 ELECTROCARDIOGRAM TRACING: CPT

## 2023-01-01 PROCEDURE — 99497 ADVNCD CARE PLAN 30 MIN: CPT | Mod: 25

## 2023-01-01 PROCEDURE — 85027 COMPLETE CBC AUTOMATED: CPT

## 2023-01-01 PROCEDURE — 29515 APPLICATION SHORT LEG SPLINT: CPT

## 2023-01-01 PROCEDURE — 71045 X-RAY EXAM CHEST 1 VIEW: CPT

## 2023-01-01 PROCEDURE — 99285 EMERGENCY DEPT VISIT HI MDM: CPT | Mod: FS,57

## 2023-01-01 PROCEDURE — 99223 1ST HOSP IP/OBS HIGH 75: CPT

## 2023-01-01 PROCEDURE — 86901 BLOOD TYPING SEROLOGIC RH(D): CPT

## 2023-01-01 PROCEDURE — L8699: CPT

## 2023-01-01 PROCEDURE — 93010 ELECTROCARDIOGRAM REPORT: CPT

## 2023-01-01 PROCEDURE — 82962 GLUCOSE BLOOD TEST: CPT

## 2023-01-01 PROCEDURE — 85610 PROTHROMBIN TIME: CPT

## 2023-01-01 PROCEDURE — 99233 SBSQ HOSP IP/OBS HIGH 50: CPT | Mod: FS

## 2023-01-01 PROCEDURE — 86850 RBC ANTIBODY SCREEN: CPT

## 2023-01-01 PROCEDURE — 85025 COMPLETE CBC W/AUTO DIFF WBC: CPT

## 2023-01-01 PROCEDURE — 74018 RADEX ABDOMEN 1 VIEW: CPT

## 2023-01-01 PROCEDURE — 84484 ASSAY OF TROPONIN QUANT: CPT

## 2023-01-01 PROCEDURE — 36415 COLL VENOUS BLD VENIPUNCTURE: CPT

## 2023-01-01 PROCEDURE — 96374 THER/PROPH/DIAG INJ IV PUSH: CPT

## 2023-01-01 PROCEDURE — 49465 FLUORO EXAM OF G/COLON TUBE: CPT

## 2023-01-01 PROCEDURE — 99239 HOSP IP/OBS DSCHRG MGMT >30: CPT

## 2023-01-01 PROCEDURE — 43762 RPLC GTUBE NO REVJ TRC: CPT

## 2023-01-01 PROCEDURE — 73620 X-RAY EXAM OF FOOT: CPT | Mod: 26,LT

## 2023-01-01 PROCEDURE — 99284 EMERGENCY DEPT VISIT MOD MDM: CPT | Mod: 25

## 2023-01-01 PROCEDURE — 28470 CLTX METATARSAL FX WO MNP EA: CPT | Mod: 54,T2,T3

## 2023-01-01 PROCEDURE — 99285 EMERGENCY DEPT VISIT HI MDM: CPT | Mod: 25

## 2023-01-01 PROCEDURE — 80048 BASIC METABOLIC PNL TOTAL CA: CPT

## 2023-01-01 PROCEDURE — 83036 HEMOGLOBIN GLYCOSYLATED A1C: CPT

## 2023-01-01 PROCEDURE — 99232 SBSQ HOSP IP/OBS MODERATE 35: CPT

## 2023-01-01 PROCEDURE — 85730 THROMBOPLASTIN TIME PARTIAL: CPT

## 2023-01-01 PROCEDURE — 73610 X-RAY EXAM OF ANKLE: CPT | Mod: 26,LT

## 2023-01-01 PROCEDURE — 87040 BLOOD CULTURE FOR BACTERIA: CPT

## 2023-01-01 PROCEDURE — 73630 X-RAY EXAM OF FOOT: CPT

## 2023-01-01 PROCEDURE — 43246 EGD PLACE GASTROSTOMY TUBE: CPT

## 2023-01-01 PROCEDURE — 99285 EMERGENCY DEPT VISIT HI MDM: CPT | Mod: FS

## 2023-01-01 PROCEDURE — 99282 EMERGENCY DEPT VISIT SF MDM: CPT

## 2023-01-01 PROCEDURE — 71045 X-RAY EXAM CHEST 1 VIEW: CPT | Mod: 26

## 2023-01-01 PROCEDURE — 74018 RADEX ABDOMEN 1 VIEW: CPT | Mod: 26

## 2023-01-01 PROCEDURE — 80053 COMPREHEN METABOLIC PANEL: CPT

## 2023-01-01 PROCEDURE — 99223 1ST HOSP IP/OBS HIGH 75: CPT | Mod: FS

## 2023-01-01 PROCEDURE — 99232 SBSQ HOSP IP/OBS MODERATE 35: CPT | Mod: FS

## 2023-01-01 PROCEDURE — 86900 BLOOD TYPING SEROLOGIC ABO: CPT

## 2023-01-01 PROCEDURE — 99283 EMERGENCY DEPT VISIT LOW MDM: CPT

## 2023-01-01 PROCEDURE — 90935 HEMODIALYSIS ONE EVALUATION: CPT

## 2023-01-01 PROCEDURE — 99285 EMERGENCY DEPT VISIT HI MDM: CPT

## 2023-01-01 PROCEDURE — 73620 X-RAY EXAM OF FOOT: CPT

## 2023-01-01 DEVICE — PEG KT SAFETY 20FR: Type: IMPLANTABLE DEVICE | Status: FUNCTIONAL

## 2023-01-01 RX ORDER — DIAZEPAM 5 MG
5 TABLET ORAL
Refills: 0 | Status: DISCONTINUED | OUTPATIENT
Start: 2023-01-01 | End: 2023-01-01

## 2023-01-01 RX ORDER — ATORVASTATIN CALCIUM 80 MG/1
10 TABLET, FILM COATED ORAL AT BEDTIME
Refills: 0 | Status: DISCONTINUED | OUTPATIENT
Start: 2023-01-01 | End: 2023-01-01

## 2023-01-01 RX ORDER — ERYTHROPOIETIN 10000 [IU]/ML
10000 INJECTION, SOLUTION INTRAVENOUS; SUBCUTANEOUS
Refills: 0 | Status: DISCONTINUED | OUTPATIENT
Start: 2023-01-01 | End: 2023-01-01

## 2023-01-01 RX ORDER — CEFTRIAXONE 500 MG/1
INJECTION, POWDER, FOR SOLUTION INTRAMUSCULAR; INTRAVENOUS
Refills: 0 | Status: DISCONTINUED | OUTPATIENT
Start: 2023-01-01 | End: 2023-01-01

## 2023-01-01 RX ORDER — CLOPIDOGREL BISULFATE 75 MG/1
75 TABLET, FILM COATED ORAL DAILY
Refills: 0 | Status: DISCONTINUED | OUTPATIENT
Start: 2023-01-01 | End: 2023-01-01

## 2023-01-01 RX ORDER — HEPARIN SODIUM 5000 [USP'U]/ML
5000 INJECTION INTRAVENOUS; SUBCUTANEOUS EVERY 12 HOURS
Refills: 0 | Status: DISCONTINUED | OUTPATIENT
Start: 2023-01-01 | End: 2023-01-01

## 2023-01-01 RX ORDER — GENTAMICIN SULFATE 40 MG/ML
100 VIAL (ML) INJECTION ONCE
Refills: 0 | Status: DISCONTINUED | OUTPATIENT
Start: 2023-01-01 | End: 2023-01-01

## 2023-01-01 RX ORDER — SODIUM CHLORIDE 9 MG/ML
1000 INJECTION, SOLUTION INTRAVENOUS
Refills: 0 | Status: DISCONTINUED | OUTPATIENT
Start: 2023-01-01 | End: 2023-01-01

## 2023-01-01 RX ORDER — POTASSIUM CHLORIDE 20 MEQ
40 PACKET (EA) ORAL ONCE
Refills: 0 | Status: COMPLETED | OUTPATIENT
Start: 2023-01-01 | End: 2023-01-01

## 2023-01-01 RX ORDER — SENNA PLUS 8.6 MG/1
2 TABLET ORAL AT BEDTIME
Refills: 0 | Status: DISCONTINUED | OUTPATIENT
Start: 2023-01-01 | End: 2023-01-01

## 2023-01-01 RX ORDER — SEVELAMER CARBONATE 2400 MG/1
0 POWDER, FOR SUSPENSION ORAL
Qty: 0 | Refills: 0 | DISCHARGE

## 2023-01-01 RX ORDER — SODIUM CHLORIDE 9 MG/ML
500 INJECTION INTRAMUSCULAR; INTRAVENOUS; SUBCUTANEOUS ONCE
Refills: 0 | Status: COMPLETED | OUTPATIENT
Start: 2023-01-01 | End: 2023-01-01

## 2023-01-01 RX ORDER — FAMOTIDINE 10 MG/ML
20 INJECTION INTRAVENOUS DAILY
Refills: 0 | Status: DISCONTINUED | OUTPATIENT
Start: 2023-01-01 | End: 2023-01-01

## 2023-01-01 RX ORDER — CEFTRIAXONE 500 MG/1
1000 INJECTION, POWDER, FOR SOLUTION INTRAMUSCULAR; INTRAVENOUS ONCE
Refills: 0 | Status: COMPLETED | OUTPATIENT
Start: 2023-01-01 | End: 2023-01-01

## 2023-01-01 RX ORDER — CEFTRIAXONE 500 MG/1
1000 INJECTION, POWDER, FOR SOLUTION INTRAMUSCULAR; INTRAVENOUS EVERY 24 HOURS
Refills: 0 | Status: DISCONTINUED | OUTPATIENT
Start: 2023-01-01 | End: 2023-01-01

## 2023-01-01 RX ORDER — QUETIAPINE FUMARATE 200 MG/1
25 TABLET, FILM COATED ORAL DAILY
Refills: 0 | Status: DISCONTINUED | OUTPATIENT
Start: 2023-01-01 | End: 2023-01-01

## 2023-01-01 RX ORDER — POLYETHYLENE GLYCOL 3350 17 G/17G
17 POWDER, FOR SOLUTION ORAL DAILY
Refills: 0 | Status: DISCONTINUED | OUTPATIENT
Start: 2023-01-01 | End: 2023-01-01

## 2023-01-01 RX ORDER — ACETAMINOPHEN 500 MG
1000 TABLET ORAL ONCE
Refills: 0 | Status: COMPLETED | OUTPATIENT
Start: 2023-01-01 | End: 2023-01-01

## 2023-01-01 RX ORDER — CHLORHEXIDINE GLUCONATE 213 G/1000ML
1 SOLUTION TOPICAL ONCE
Refills: 0 | Status: COMPLETED | OUTPATIENT
Start: 2023-01-01 | End: 2023-01-01

## 2023-01-01 RX ORDER — CHOLECALCIFEROL (VITAMIN D3) 125 MCG
1000 CAPSULE ORAL DAILY
Refills: 0 | Status: DISCONTINUED | OUTPATIENT
Start: 2023-01-01 | End: 2023-01-01

## 2023-01-01 RX ORDER — VANCOMYCIN HCL 1 G
1000 VIAL (EA) INTRAVENOUS ONCE
Refills: 0 | Status: DISCONTINUED | OUTPATIENT
Start: 2023-01-01 | End: 2023-01-01

## 2023-01-01 RX ORDER — ACETAMINOPHEN 500 MG
650 TABLET ORAL ONCE
Refills: 0 | Status: DISCONTINUED | OUTPATIENT
Start: 2023-01-01 | End: 2023-01-01

## 2023-01-01 RX ORDER — DEXTROSE 50 % IN WATER 50 %
25 SYRINGE (ML) INTRAVENOUS ONCE
Refills: 0 | Status: DISCONTINUED | OUTPATIENT
Start: 2023-01-01 | End: 2023-01-01

## 2023-01-01 RX ORDER — INSULIN LISPRO 100/ML
VIAL (ML) SUBCUTANEOUS EVERY 6 HOURS
Refills: 0 | Status: DISCONTINUED | OUTPATIENT
Start: 2023-01-01 | End: 2023-01-01

## 2023-01-01 RX ORDER — CALCITRIOL 0.5 UG/1
0 CAPSULE ORAL
Qty: 0 | Refills: 0 | DISCHARGE

## 2023-01-01 RX ORDER — AMLODIPINE BESYLATE 2.5 MG/1
2.5 TABLET ORAL DAILY
Refills: 0 | Status: DISCONTINUED | OUTPATIENT
Start: 2023-01-01 | End: 2023-01-01

## 2023-01-01 RX ORDER — SERTRALINE 25 MG/1
100 TABLET, FILM COATED ORAL
Refills: 0 | Status: DISCONTINUED | OUTPATIENT
Start: 2023-01-01 | End: 2023-01-01

## 2023-01-01 RX ORDER — DEXTROSE 50 % IN WATER 50 %
15 SYRINGE (ML) INTRAVENOUS ONCE
Refills: 0 | Status: DISCONTINUED | OUTPATIENT
Start: 2023-01-01 | End: 2023-01-01

## 2023-01-01 RX ORDER — GENTAMICIN SULFATE 40 MG/ML
100 VIAL (ML) INJECTION ONCE
Refills: 0 | Status: COMPLETED | OUTPATIENT
Start: 2023-01-01 | End: 2023-01-01

## 2023-01-01 RX ORDER — SPIRONOLACTONE 25 MG/1
1 TABLET, FILM COATED ORAL
Qty: 0 | Refills: 0 | DISCHARGE

## 2023-01-01 RX ORDER — LIDOCAINE AND PRILOCAINE CREAM 25; 25 MG/G; MG/G
1 CREAM TOPICAL
Refills: 0 | Status: DISCONTINUED | OUTPATIENT
Start: 2023-01-01 | End: 2023-01-01

## 2023-01-01 RX ORDER — HEPARIN SODIUM 5000 [USP'U]/ML
5000 INJECTION INTRAVENOUS; SUBCUTANEOUS EVERY 8 HOURS
Refills: 0 | Status: DISCONTINUED | OUTPATIENT
Start: 2023-01-01 | End: 2023-01-01

## 2023-01-01 RX ORDER — CEFTRIAXONE 500 MG/1
1000 INJECTION, POWDER, FOR SOLUTION INTRAMUSCULAR; INTRAVENOUS ONCE
Refills: 0 | Status: DISCONTINUED | OUTPATIENT
Start: 2023-01-01 | End: 2023-01-01

## 2023-01-01 RX ORDER — DEXTROSE 50 % IN WATER 50 %
12.5 SYRINGE (ML) INTRAVENOUS ONCE
Refills: 0 | Status: DISCONTINUED | OUTPATIENT
Start: 2023-01-01 | End: 2023-01-01

## 2023-01-01 RX ORDER — VANCOMYCIN HCL 1 G
1000 VIAL (EA) INTRAVENOUS ONCE
Refills: 0 | Status: COMPLETED | OUTPATIENT
Start: 2023-01-01 | End: 2023-01-01

## 2023-01-01 RX ORDER — CEFTRIAXONE 500 MG/1
1000 INJECTION, POWDER, FOR SOLUTION INTRAMUSCULAR; INTRAVENOUS EVERY 24 HOURS
Refills: 0 | Status: COMPLETED | OUTPATIENT
Start: 2023-01-01 | End: 2023-01-01

## 2023-01-01 RX ORDER — PANTOPRAZOLE SODIUM 20 MG/1
1 TABLET, DELAYED RELEASE ORAL
Qty: 0 | Refills: 0 | DISCHARGE

## 2023-01-01 RX ORDER — GLUCAGON INJECTION, SOLUTION 0.5 MG/.1ML
1 INJECTION, SOLUTION SUBCUTANEOUS ONCE
Refills: 0 | Status: DISCONTINUED | OUTPATIENT
Start: 2023-01-01 | End: 2023-01-01

## 2023-01-01 RX ORDER — LABETALOL HCL 100 MG
0 TABLET ORAL
Qty: 0 | Refills: 0 | DISCHARGE

## 2023-01-01 RX ORDER — CEPHALEXIN 500 MG
1 CAPSULE ORAL
Qty: 10 | Refills: 0
Start: 2023-01-01 | End: 2023-01-01

## 2023-01-01 RX ORDER — POLYETHYLENE GLYCOL 3350 17 G/17G
17 POWDER, FOR SOLUTION ORAL
Refills: 0 | Status: DISCONTINUED | OUTPATIENT
Start: 2023-01-01 | End: 2023-01-01

## 2023-01-01 RX ADMIN — SERTRALINE 100 MILLIGRAM(S): 25 TABLET, FILM COATED ORAL at 05:15

## 2023-01-01 RX ADMIN — SERTRALINE 100 MILLIGRAM(S): 25 TABLET, FILM COATED ORAL at 19:17

## 2023-01-01 RX ADMIN — HEPARIN SODIUM 5000 UNIT(S): 5000 INJECTION INTRAVENOUS; SUBCUTANEOUS at 05:04

## 2023-01-01 RX ADMIN — ATORVASTATIN CALCIUM 10 MILLIGRAM(S): 80 TABLET, FILM COATED ORAL at 21:07

## 2023-01-01 RX ADMIN — CEFTRIAXONE 1000 MILLIGRAM(S): 500 INJECTION, POWDER, FOR SOLUTION INTRAMUSCULAR; INTRAVENOUS at 16:48

## 2023-01-01 RX ADMIN — QUETIAPINE FUMARATE 25 MILLIGRAM(S): 200 TABLET, FILM COATED ORAL at 11:43

## 2023-01-01 RX ADMIN — CLOPIDOGREL BISULFATE 75 MILLIGRAM(S): 75 TABLET, FILM COATED ORAL at 11:43

## 2023-01-01 RX ADMIN — SODIUM CHLORIDE 75 MILLILITER(S): 9 INJECTION, SOLUTION INTRAVENOUS at 18:04

## 2023-01-01 RX ADMIN — ATORVASTATIN CALCIUM 10 MILLIGRAM(S): 80 TABLET, FILM COATED ORAL at 00:25

## 2023-01-01 RX ADMIN — Medication 200 MILLIGRAM(S): at 14:00

## 2023-01-01 RX ADMIN — FAMOTIDINE 100 MILLIGRAM(S): 10 INJECTION INTRAVENOUS at 17:59

## 2023-01-01 RX ADMIN — Medication 5 MILLIGRAM(S): at 19:18

## 2023-01-01 RX ADMIN — CHLORHEXIDINE GLUCONATE 1 APPLICATION(S): 213 SOLUTION TOPICAL at 08:18

## 2023-01-01 RX ADMIN — HEPARIN SODIUM 5000 UNIT(S): 5000 INJECTION INTRAVENOUS; SUBCUTANEOUS at 21:48

## 2023-01-01 RX ADMIN — HEPARIN SODIUM 5000 UNIT(S): 5000 INJECTION INTRAVENOUS; SUBCUTANEOUS at 17:43

## 2023-01-01 RX ADMIN — HEPARIN SODIUM 5000 UNIT(S): 5000 INJECTION INTRAVENOUS; SUBCUTANEOUS at 17:28

## 2023-01-01 RX ADMIN — Medication 5 MILLIGRAM(S): at 21:48

## 2023-01-01 RX ADMIN — HEPARIN SODIUM 5000 UNIT(S): 5000 INJECTION INTRAVENOUS; SUBCUTANEOUS at 05:08

## 2023-01-01 RX ADMIN — Medication 5 MILLIGRAM(S): at 06:13

## 2023-01-01 RX ADMIN — Medication 400 MILLIGRAM(S): at 20:59

## 2023-01-01 RX ADMIN — POLYETHYLENE GLYCOL 3350 17 GRAM(S): 17 POWDER, FOR SOLUTION ORAL at 22:51

## 2023-01-01 RX ADMIN — CEFTRIAXONE 100 MILLIGRAM(S): 500 INJECTION, POWDER, FOR SOLUTION INTRAMUSCULAR; INTRAVENOUS at 16:30

## 2023-01-01 RX ADMIN — Medication 5 MILLIGRAM(S): at 21:13

## 2023-01-01 RX ADMIN — Medication 2: at 12:56

## 2023-01-01 RX ADMIN — SODIUM CHLORIDE 50 MILLILITER(S): 9 INJECTION, SOLUTION INTRAVENOUS at 21:35

## 2023-01-01 RX ADMIN — FAMOTIDINE 20 MILLIGRAM(S): 10 INJECTION INTRAVENOUS at 13:02

## 2023-01-01 RX ADMIN — HEPARIN SODIUM 5000 UNIT(S): 5000 INJECTION INTRAVENOUS; SUBCUTANEOUS at 00:25

## 2023-01-01 RX ADMIN — Medication 1000 MILLIGRAM(S): at 21:29

## 2023-01-01 RX ADMIN — QUETIAPINE FUMARATE 25 MILLIGRAM(S): 200 TABLET, FILM COATED ORAL at 13:02

## 2023-01-01 RX ADMIN — LIDOCAINE AND PRILOCAINE CREAM 1 APPLICATION(S): 25; 25 CREAM TOPICAL at 11:43

## 2023-01-01 RX ADMIN — CEFTRIAXONE 1000 MILLIGRAM(S): 500 INJECTION, POWDER, FOR SOLUTION INTRAMUSCULAR; INTRAVENOUS at 16:41

## 2023-01-01 RX ADMIN — Medication 5 MILLIGRAM(S): at 05:08

## 2023-01-01 RX ADMIN — CLOPIDOGREL BISULFATE 75 MILLIGRAM(S): 75 TABLET, FILM COATED ORAL at 13:02

## 2023-01-01 RX ADMIN — FAMOTIDINE 100 MILLIGRAM(S): 10 INJECTION INTRAVENOUS at 12:15

## 2023-01-01 RX ADMIN — AMLODIPINE BESYLATE 2.5 MILLIGRAM(S): 2.5 TABLET ORAL at 05:08

## 2023-01-01 RX ADMIN — SENNA PLUS 2 TABLET(S): 8.6 TABLET ORAL at 22:51

## 2023-01-01 RX ADMIN — AMLODIPINE BESYLATE 2.5 MILLIGRAM(S): 2.5 TABLET ORAL at 05:54

## 2023-01-01 RX ADMIN — HEPARIN SODIUM 5000 UNIT(S): 5000 INJECTION INTRAVENOUS; SUBCUTANEOUS at 13:02

## 2023-01-01 RX ADMIN — Medication 5 MILLIGRAM(S): at 05:53

## 2023-01-01 RX ADMIN — SERTRALINE 100 MILLIGRAM(S): 25 TABLET, FILM COATED ORAL at 17:43

## 2023-01-01 RX ADMIN — QUETIAPINE FUMARATE 25 MILLIGRAM(S): 200 TABLET, FILM COATED ORAL at 11:46

## 2023-01-01 RX ADMIN — HEPARIN SODIUM 5000 UNIT(S): 5000 INJECTION INTRAVENOUS; SUBCUTANEOUS at 17:23

## 2023-01-01 RX ADMIN — ERYTHROPOIETIN 10000 UNIT(S): 10000 INJECTION, SOLUTION INTRAVENOUS; SUBCUTANEOUS at 15:01

## 2023-01-01 RX ADMIN — HEPARIN SODIUM 5000 UNIT(S): 5000 INJECTION INTRAVENOUS; SUBCUTANEOUS at 05:20

## 2023-01-01 RX ADMIN — SERTRALINE 100 MILLIGRAM(S): 25 TABLET, FILM COATED ORAL at 05:53

## 2023-01-01 RX ADMIN — POLYETHYLENE GLYCOL 3350 17 GRAM(S): 17 POWDER, FOR SOLUTION ORAL at 05:08

## 2023-01-01 RX ADMIN — Medication 40 MILLIEQUIVALENT(S): at 11:46

## 2023-01-01 RX ADMIN — Medication 5 MILLIGRAM(S): at 05:04

## 2023-01-01 RX ADMIN — Medication 5 MILLIGRAM(S): at 17:42

## 2023-01-01 RX ADMIN — Medication 250 MILLIGRAM(S): at 14:30

## 2023-01-01 RX ADMIN — AMLODIPINE BESYLATE 2.5 MILLIGRAM(S): 2.5 TABLET ORAL at 11:43

## 2023-01-01 RX ADMIN — Medication 1000 UNIT(S): at 13:01

## 2023-01-01 RX ADMIN — CLOPIDOGREL BISULFATE 75 MILLIGRAM(S): 75 TABLET, FILM COATED ORAL at 11:46

## 2023-01-01 RX ADMIN — FAMOTIDINE 100 MILLIGRAM(S): 10 INJECTION INTRAVENOUS at 12:28

## 2023-01-01 RX ADMIN — HEPARIN SODIUM 5000 UNIT(S): 5000 INJECTION INTRAVENOUS; SUBCUTANEOUS at 05:54

## 2023-01-01 RX ADMIN — HEPARIN SODIUM 5000 UNIT(S): 5000 INJECTION INTRAVENOUS; SUBCUTANEOUS at 06:13

## 2023-05-10 NOTE — ED ADULT NURSE NOTE - OBJECTIVE STATEMENT
Pt BIBA from home, with c/o dislodged g-tube.  Per spouse at bedside, reports dislodged g-tube today.  20 [place] g-tube brought in by .  Denies any further complaints.

## 2023-05-10 NOTE — ED PROVIDER NOTE - PATIENT PORTAL LINK FT
You can access the FollowMyHealth Patient Portal offered by [facility] by registering at the following website: [email or web address]. By joining [employer]’s FollowMyHealth portal, you will also be able to view your health information using other applications (apps) compatible with our system.

## 2023-05-10 NOTE — ED PROVIDER NOTE - NSFOLLOWUPINSTRUCTIONS_ED_ALL_ED_FT
flush tube with water after each feeding  keep skin ostium clean and dry   if any excoriation apply bacitracin daily

## 2023-05-10 NOTE — ED PROVIDER NOTE - PHYSICAL EXAMINATION
General:     NAD, well-nourished, well-appearing  Head:     NC/AT, EOMI, oral mucosa moist  Neck:     supple  Lungs:     CTA b/l, no w/r/r  CVS:     S1S2, RRR, no m/g/r  Abd:     +BS, s/nt/nd, no organomegaly, feeding tube site clead , no sign of infection  Ext:    2+ radial and pedal pulses, no c/c/e  Neuro: grossly intact

## 2023-05-10 NOTE — ED ADULT NURSE NOTE - NSFALLRISKINTERV_ED_ALL_ED

## 2023-05-11 PROBLEM — I10 ESSENTIAL (PRIMARY) HYPERTENSION: Chronic | Status: ACTIVE | Noted: 2019-03-27

## 2023-05-11 PROBLEM — N18.6 END STAGE RENAL DISEASE: Chronic | Status: ACTIVE | Noted: 2019-03-27

## 2023-07-03 NOTE — ED PROVIDER NOTE - NSICDXPASTMEDICALHX_GEN_ALL_CORE_FT
PAST MEDICAL HISTORY:  CVA (cerebrovascular accident)     ESRD (end stage renal disease) on dialysis     Hypertension

## 2023-07-03 NOTE — ED ADULT NURSE NOTE - NSFALLRISKINTERV_ED_ALL_ED

## 2023-07-03 NOTE — ED PROVIDER NOTE - OBJECTIVE STATEMENT
77F with PMhx HTN, DM, CVA c/w esophageal paralysis s/p g-tube presents for g-tube malfunction. pt's aide states she was trying to use g-tube but constantly refluxes back out. last g-tube use was last night. pt denies fevers/chills, pain, nausea/vomiting. pt states she was in ED 2 months ago and had g-tube replaced for reflux of blood in g-tube. 77F with PMhx HTN, DM, CKD on HD M/W/F, CVA c/w esophageal paralysis s/p g-tube presents for g-tube malfunction. pt's aide states she was trying to use g-tube but constantly refluxes back out. last g-tube use was last night. pt denies fevers/chills, pain, nausea/vomiting. pt states she was in ED 2 months ago and had g-tube replaced for reflux of blood in g-tube.

## 2023-07-03 NOTE — ED PROVIDER NOTE - CLINICAL SUMMARY MEDICAL DECISION MAKING FREE TEXT BOX
77F with PMhx HTN, DM, CKD on HD M/W/F, CVA c/w esophageal paralysis s/p g-tube presents for g-tube malfunction. pt's aide states she was trying to use g-tube but constantly refluxes back out. last g-tube use was last night. pt denies fevers/chills, pain, nausea/vomiting. pt states she was in ED 2 months ago and had g-tube replaced for reflux of blood in g-tube.    Tube removed and placed anew. Xray confirms. Pt darrius well. Worsening, continued or ANY new concerning symptoms return to the emergency department.

## 2023-07-03 NOTE — ED PROVIDER NOTE - ATTENDING CONTRIBUTION TO CARE
Jeb with Resident Dr Aguilar. 77F with PMhx HTN, DM, CKD on HD M/W/F, CVA c/w esophageal paralysis s/p g-tube presents for g-tube malfunction. pt's aide states she was trying to use g-tube but constantly refluxes back out. last g-tube use was last night. pt denies fevers/chills, pain, nausea/vomiting. pt states she was in ED 2 months ago and had g-tube replaced for reflux of blood in g-tube.    New 18Fr tube placed. Pt darrius well.     I performed a face to face bedside interview with patient regarding history of present illness, review of symptoms and past medical history. I completed an independent physical exam.  I have discussed the patient's plan of care with Resident. I agree with note as stated above, having amended the EMR as needed to reflect my findings.   This includes History of Present Illness, HIV, Past Medical/Surgical/Family/Social History, Allergies and Home Medications, Review of Systems, Physical Exam, and any Progress Notes during the time I functioned as the attending physician for this patient.

## 2023-07-03 NOTE — ED PROVIDER NOTE - PHYSICAL EXAMINATION
Vitals  T(F): 97.6 (07-03-23 @ 10:34), Max: 97.6 (07-03-23 @ 10:34)  HR: 78 (07-03-23 @ 10:34) (78 - 78)  BP: 134/80 (07-03-23 @ 10:34) (134/80 - 134/80)  RR: 16 (07-03-23 @ 10:34) (16 - 16)  SpO2: 96% (07-03-23 @ 10:34) (96% - 96%)    PHYSICAL EXAM   Gen: NAD, comfortable, AA&Ox3. wheelchair bound   HEENT: head atrumatic and normocephalic, PEERLA, EOMI,  no gross abnormalities of ears, mucous membranes moist, no oral lesions, neck supple without masses/goiter/lymphadenopathy, no JVD  CVS: +s1, s2, regular rate and rhythm, no murmurs, rubs or gallops, no thrill, normal PMI  Pulmonary: normal respiratory effort, clear to auscultation b/l, no wheezes/crackles/rhonchi  Abdomen: soft, non-tender, non-distended, +bowel sounds in all 4 quadrants, no masses noted, no guarding or rigidity. g-tube present without evidence of discharge or erythema. no evidence of infection.  Back: no scoliosis, lordosis, or kyphosis, no point tenderness, no CVA tenderness   Extremities: no pedal edema, pedal pulses palpable, <2 sec. cap refill   Skin: warm and dry, no wounds Vitals  T(F): 97.6 (07-03-23 @ 10:34), Max: 97.6 (07-03-23 @ 10:34)  HR: 78 (07-03-23 @ 10:34) (78 - 78)  BP: 134/80 (07-03-23 @ 10:34) (134/80 - 134/80)  RR: 16 (07-03-23 @ 10:34) (16 - 16)  SpO2: 96% (07-03-23 @ 10:34) (96% - 96%)    PHYSICAL EXAM   Gen: NAD, comfortable, AA&Ox3. wheelchair bound   Abdomen: soft, non-tender, non-distended, +bowel sounds in all 4 quadrants, no masses noted, no guarding or rigidity. g-tube present without evidence of discharge or erythema. no evidence of infection.  Back: no scoliosis, lordosis, or kyphosis, no point tenderness, no CVA tenderness   Extremities: no pedal edema, pedal pulses palpable, <2 sec. cap refill   Skin: warm and dry, no wounds

## 2023-07-03 NOTE — ED PROVIDER NOTE - PATIENT PORTAL LINK FT
You can access the FollowMyHealth Patient Portal offered by [facility] by registering at the following website: [email or web address]. By joining [facility]’s FollowMyHealth portal, you will also be able to view your health information using other applications (apps) compatible with our system.

## 2023-07-03 NOTE — ED PROVIDER NOTE - NSFOLLOWUPINSTRUCTIONS_ED_ALL_ED_FT
18French PEG tube placed. Okay to use! Thank you for choosing [facility]. Worsening, continued or ANY new concerning symptoms return to the emergency department.

## 2023-07-03 NOTE — ED PROVIDER NOTE - NS ED ROS FT
Review of Symptoms  Gen: no fevers, chills, sweats, weight loss, fatigue  Cardiovascular: no chest pain, no palpitations, no orthopnea, no leg swelling  Respiratory: no shortness of breath, no exertional dyspnea, no cough, no rhinorrhea, no nasal congestion  GI: no difficulty swallowing, no nausea, no vomiting, no abdominal pain, no diarrhea, no constipation, no melana  : no dysuria, no increased freq, no hematuria, no malodorous urine

## 2023-07-03 NOTE — ED PROVIDER NOTE - NS ED ATTENDING STATEMENT MOD
I have seen and examined this patient and fully participated in the care of this patient as the teaching attending.  The service was shared with the BONNY.  I reviewed and verified the documentation and independently performed the documented:

## 2023-07-03 NOTE — ED PROCEDURE NOTE - ATTENDING CONTRIBUTION TO CARE
PEG placed with Dr Aguilar. Attending Note: This procedure was done by me or under my direct supervision with the resident or the advanced practice clinician.  I discussed the medical necessity of this procedure with the patient or the patient's health care proxy.  Verbal consent for this procedure was obtained after the risks, benefits, and alternatives to this procedure was discussed..  This procedure was perfomed successfully without significant complications.  The patient tolerated the procedure and was observed for a period of time after the procedue.  I discussed with the patient or the health care proxy the later complications of this procedure including the signs and symptoms that would require further medical attention.  The patient or health care proxy appears to understand these issues.

## 2023-07-06 NOTE — CHART NOTE - NSCHARTNOTEFT_GEN_A_CORE
SW placed call to patient to discuss and assist with follow up care.  Patient presented to ED on 7/3/23 due to feeding tube problem.  SW spoke with spouse who reports patient is scheduled to see PMD, Dr Jarvis, next week.  Declined needing further SW assistance at this time.

## 2023-10-02 PROBLEM — I63.9 CEREBRAL INFARCTION, UNSPECIFIED: Chronic | Status: ACTIVE | Noted: 2023-01-01

## 2023-10-02 NOTE — ED ADULT NURSE NOTE - NSFALLHARMRISKINTERV_ED_ALL_ED

## 2023-10-02 NOTE — ED ADULT NURSE NOTE - OBJECTIVE STATEMENT
Pt came BIB HHA and spouse for PEG tube malfunction since this AM. Pt with hx CVA, ESRD (HD pt), with PEG tube in place. HHA at bedside states that pts PEG tube is clogged and she has been unable to flush the PEG tube or administer medications. Pt denies any pain or discomfort. Pt came BIB HHA and spouse for PEG tube malfunction since this AM. Pt with hx CVA, ESRD (HD pt), with PEG tube in place. HHA at bedside states that pts PEG tube is clogged and she has been unable to flush the PEG tube or administer medications since this morning. Pt denies any pain or discomfort.

## 2023-10-02 NOTE — ED ADULT TRIAGE NOTE - NS ED NURSE AMBULANCES
[facility]

## 2023-10-02 NOTE — ED ADULT NURSE REASSESSMENT NOTE - NS ED NURSE REASSESS COMMENT FT1
1925: Received pt from previous nurse Brittney GAYLE in room 17 B on cardiac monitor. Pt has a peg tube, left arm fistula , + thrill, + bruit. IV access noted on the right forearm #22, flushes with ease. Pt denies chest pain , SOB. No respiratory distress noted, even and unlabored breathing, abd soft BS +. Skin warm and dry, + sensation, + capillary refill < 2 sec. call bell within reach.

## 2023-10-02 NOTE — ED PROVIDER NOTE - NSFOLLOWUPINSTRUCTIONS_ED_ALL_ED_FT
Follow up with pcp and cardiology  return to er for any worsening symptoms     Nonspecific Chest Pain, Adult  Chest pain is an uncomfortable, tight, or painful feeling in the chest. The pain can feel like a crushing, aching, or squeezing pressure. A person can feel a burning or tingling sensation. Chest pain can also be felt in your back, neck, jaw, shoulder, or arm. This pain can be worse when you move, sneeze, or take a deep breath.    Chest pain can be caused by a condition that is life-threatening. This must be treated right away. It can also be caused by something that is not life-threatening. If you have chest pain, it can be hard to know the difference, so it is important to get help right away to make sure that you do not have a serious condition.    Some life-threatening causes of chest pain include:  Heart attack.  A tear in the body's main blood vessel (aortic dissection).  Inflammation around your heart (pericarditis).  A problem in the lungs, such as a blood clot (pulmonary embolism) or a collapsed lung (pneumothorax).  Some non life-threatening causes of chest pain include:  Heartburn.  Anxiety or stress.  Damage to the bones, muscles, and cartilage that make up your chest wall.  Pneumonia or bronchitis.  Shingles infection (varicella-zoster virus).  Your chest pain may come and go. It may also be constant. Your health care provider will do tests and other studies to find the cause of your pain. Treatment will depend on the cause of your chest pain.    Follow these instructions at home:  Medicines    Take over-the-counter and prescription medicines only as told by your health care provider.  If you were prescribed an antibiotic medicine, take it as told by your health care provider. Do not stop taking the antibiotic even if you start to feel better.  Activity    Avoid any activities that cause chest pain.  Do not lift anything that is heavier than 10 lb (4.5 kg), or the limit that you are told, until your health care provider says that it is safe.  Rest as directed by your health care provider.  Return to your normal activities only as told by your health care provider. Ask your health care provider what activities are safe for you.  Lifestyle    A plate along with examples of foods in a healthy diet.  Silhouette of a person sitting on the floor doing yoga.  Do not use any products that contain nicotine or tobacco, such as cigarettes, e-cigarettes, and chewing tobacco. If you need help quitting, ask your health care provider.  Do not drink alcohol.  Make healthy lifestyle changes as recommended. These may include:  Getting regular exercise. Ask your health care provider to suggest some exercises that are safe for you.  Eating a heart-healthy diet. This includes plenty of fresh fruits and vegetables, whole grains, low-fat (lean) protein, and low-fat dairy products. A dietitian can help you find healthy eating options.  Maintaining a healthy weight.  Managing any other health conditions you may have, such as high blood pressure (hypertension) or diabetes.  Reducing stress, such as with yoga or relaxation techniques.  General instructions    Pay attention to any changes in your symptoms.  It is up to you to get the results of any tests that were done. Ask your health care provider, or the department that is doing the tests, when your results will be ready.  Keep all follow-up visits as told by your health care provider. This is important.  You may be asked to go for further testing if your chest pain does not go away.  Contact a health care provider if:  Your chest pain does not go away.  You feel depressed.  You have a fever.  You notice changes in your symptoms or develop new symptoms.  Get help right away if:  Your chest pain gets worse.  You have a cough that gets worse, or you cough up blood.  You have severe pain in your abdomen.  You faint.  You have sudden, unexplained chest discomfort.  You have sudden, unexplained discomfort in your arms, back, neck, or jaw.  You have shortness of breath at any time.  You suddenly start to sweat, or your skin gets clammy.  You feel nausea or you vomit.  You suddenly feel lightheaded or dizzy.  You have severe weakness, or unexplained weakness or fatigue.  Your heart begins to beat quickly, or it feels like it is skipping beats.  These symptoms may represent a serious problem that is an emergency. Do not wait to see if the symptoms will go away. Get medical help right away. Call your local emergency services (911 in the [place] Do not drive yourself to the hospital.    Summary  Chest pain can be caused by a condition that is serious and requires urgent treatment. It may also be caused by something that is not life-threatening.  Your health care provider may do lab tests and other studies to find the cause of your pain.  Follow your health care provider's instructions on taking medicines, making lifestyle changes, and getting emergency treatment if symptoms become worse.  Keep all follow-up visits as told by your health care provider. This includes visits for any further testing if your chest pain does not go away.  This information is not intended to replace advice given to you by your health care provider. Make sure you discuss any questions you have with your health care provider.

## 2023-10-02 NOTE — ED ADULT NURSE NOTE - TEMPLATE LIST FOR HEAD TO TOE ASSESSMENT
Onset: last couple days  Location/description: pink eye, coughing, slight headache. Denies congestion. Felt sx after being in [place]. Snoring.  Running humidifier.  Associated Symptoms: stomach pain intermittently  What improves/worsens symptoms: steamy shower  Symptom specific medications: Mucinex,, Tylenol  Intake and Output: appetite fine, voiding and stool  Activity level: resting  Temperature (route and time): 97.7, axillary at 0100  Weight:   Wt Readings from Last 1 Encounters:   01/27/20 (!) 65.8 kg (>99 %, Z= 2.37)*     * Growth percentiles are based on CDC (Girls, 2-20 Years) data.        Recent Care: 3/15/20  Did the patient have a positive coronavirus screening?: No    PLAN:  Home Care Advice provided    Caller agrees to follow recommendations.    Reason for Disposition  • Cough with no complications (all triage questions negative)  • Eye allergy due to pollen (all triage questions negative)    Protocols used: COUGH-P-AH, EYE - ALLERGY-P-AH       Cardiac

## 2023-10-02 NOTE — ED ADULT NURSE NOTE - OBJECTIVE STATEMENT
77yF presents to the ED BIBA from dialysis with reports of chest pain. as per EMS, facility reported that at dialysis patient started complaining of chest pain and was found to be hypotensive. EMS reports pt got ~3/4 of her dialysis session before episode. on arrival to ED, pt denies any chest pain. pt poor historian, AAOx2 and confused ( said this is her baseline after her stroke last year).  reports he was told she was having chest pain from the facility so she came to the ED. pt placed on cardiac monitor. pt denies CP, SOB, abdominal pain, N/V, back pain, numbness/tingling. pt has PEG tube and is reported as bedbound.     Patient A/Ox4. BIBA from dialysis for chest pain during cycle. Also became hypotensive, now improved. Hx of previous CVA with deficit. Patient on 2L NC chronically.

## 2023-10-02 NOTE — ED PROVIDER NOTE - OBJECTIVE STATEMENT
Patient is a 77-year-old female with past medical history of cva on plavix right side weakness ESRD on dialysis Monday Wednesday Friday hypertension hyperlipidemia diabetes depression peg tube EMS from dialysis for chest pain.  Patient states she was able to complete her dialysis developed chest pain and became hypotensive which is now resolved.  States she has had previous low blood pressures during dialysis but no chest pain.    cardio renal [facility] Patient is a 77-year-old female with past medical history of cva on plavix right side weakness ESRD on dialysis Monday Wednesday Friday hypertension hyperlipidemia diabetes depression peg tube EMS from dialysis for chest pain.  Patient states she was able to complete her dialysis developed chest pain and became hypotensive which is now resolved.  States she has had previous low blood pressures during dialysis but no chest pain.    cardio renal [facility] Patient is a 77-year-old female with past medical history of cva on plavix right side weakness ESRD on dialysis Monday Wednesday Friday hypertension hyperlipidemia diabetes depression peg tube EMS from dialysis for chest pain.  Patient states she was able to complete her dialysis developed chest pain and became hypotensive which is now resolved.  States she has had previous low blood pressures during dialysis but no chest pain.    cardio renal [facility]

## 2023-10-02 NOTE — ED PROVIDER NOTE - OBJECTIVE STATEMENT
77F with PMhx HTN, DM, CKD stage 3 on HD M/W/F, and CVA presents c/o feeding tube problem. 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 Her aid states she was able to give her glucerna and meds through tube last night but this morning it was clogged and she has not been able to take her medication. Tube frequently gets clogged every few mos. Denies fever, chills, n/v/d, constipation, chest pain, SOB. 77F with PMhx HTN, DM, CKD stage 3 on HD M/W/F, and CVA presents c/o feeding tube problem. 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 Her aid states she was able to give her glucerna and meds through tube last night but this morning it was clogged and she has not been able to take her medication. Tube frequently gets clogged every few months. Denies fever, chills, n/v/d, constipation, chest pain, SOB. Patient at baseline mental status.

## 2023-10-02 NOTE — ED PROVIDER NOTE - MUSCULOSKELETAL, MLM
Spine appears normal, range of motion is not limited, no muscle or joint tenderness left dialysis access

## 2023-10-02 NOTE — ED ADULT TRIAGE NOTE - CHIEF COMPLAINT QUOTE
Patient A/Ox4. BIBA from dialysis for chest pain during cycle. Also became hypotensive, now improved. Hx of previous CVA with deficit. Patient on 2L NC chronically.

## 2023-10-02 NOTE — ED PROVIDER NOTE - CLINICAL SUMMARY MEDICAL DECISION MAKING FREE TEXT BOX
PEG tube clogged, Nurse was able to flush tube and suction out gastric contents. Patient without abdominal pain afterwards and at baseline mental status per aide at bedside.   - dc home

## 2023-10-02 NOTE — ED PROVIDER NOTE - CLINICAL SUMMARY MEDICAL DECISION MAKING FREE TEXT BOX
Here after episode of chest pain after dialysis.  Patient also reportedly had hypotension, BP normal here.  Check labs, EKG, reevaluate

## 2023-10-02 NOTE — ED PROVIDER NOTE - ATTENDING CONTRIBUTION TO CARE
DAVID Saavedra MD: I have reviewed and discussed the medical student’s documentation and findings with the student. After personally examining the patient, my findings have been added to this documentation.

## 2023-10-02 NOTE — ED PROVIDER NOTE - ATTENDING APP SHARED VISIT CONTRIBUTION OF CARE
This was a shared visit with BONNY. I reviewed and verified the documentation and independently performed the documented MDM.

## 2023-10-02 NOTE — ED PROVIDER NOTE - PROGRESS NOTE DETAILS
no acute findings in labs pt satting well on room air states feels fine no chest pain cxr effusion no signs of fluid overload  bedside will take pt home

## 2023-10-02 NOTE — ED ADULT TRIAGE NOTE - GLASGOW COMA SCALE: SCORE, MLM
aspart (NOVOLOG FLEXPEN) 100 UNIT/ML injection pen Inject 8 Units into the skin 3 times daily (before meals) 8/7/18  Yes CHELSEA Levin - ANGELIKA   Insulin Degludec (TRESIBA FLEXTOUCH) 200 UNIT/ML SOPN Inject 50 Units into the skin daily  Patient taking differently: Inject 50 Units into the skin nightly  8/7/18  Yes CHELSEA Levin - ANGELIKA   alogliptin-metformin 12.5-1000 MG TABS Take 1 tablet by mouth 2 times daily 8/7/18  Yes Tracy Plasencia APRN - CNP   Liraglutide (VICTOZA) 18 MG/3ML SOPN SC injection INJECT 1.8 MG INTO THE SKIN DAILY AS DIRECTED  Patient taking differently: Inject into the skin daily INJECT 1.8 MG INTO THE SKIN DAILY AS DIRECTED 8/7/18  Yes CHELSEA Levin - CNP   Insulin Pen Needle (BD ULTRA-FINE PEN NEEDLES) 29G X 12.7MM MISC USE AS DIRECTED BY PHYSICIAN 5 times daily 8/7/18  Yes Tracy Plasencia APRN - ANGELIKA   metoprolol succinate (TOPROL XL) 50 MG extended release tablet Take 1 tablet by mouth daily 5/21/18  Yes Concha Diaz MD   atorvastatin (LIPITOR) 40 MG tablet TAKE 1 TABLET BY MOUTH DAILY 2/26/18  Yes Tracy Plasencia APRN - CNP   escitalopram (LEXAPRO) 10 MG tablet TAKE 1 TABLET EVERY DAY 2/26/18  Yes Tracy Plasencia APRN - CNP   losartan (COZAAR) 25 MG tablet TAKE 1 TABLET BY MOUTH DAILY 2/26/18  Yes Tracy Plasencia APRN - CNP   Blood Glucose Monitoring Suppl BERE 1 Units by Does not apply route three times daily Unit insurance will cover 12/29/17  Yes CHELSEA Quiñones CNP   glucose blood VI test strips (FREESTYLE LITE) strip Use to check blood sugars 3 times daily and as needed for Diabetes E11.40 9/7/17  Yes Tracy Plasencia APRN - CNP   fludrocortisone (FLORINEF) 0.1 MG tablet Take 2 tablets by mouth daily 8/1/17  Yes Concha Diaz MD   omeprazole (PRILOSEC) 20 MG delayed release capsule Take 1 capsule by mouth daily 5/9/17  Yes Concha Diaz MD   Blood Pressure Monitor KIT 1 each by Does not apply route daily as needed ESSENTIAL HYPERTENSION   I10 5/31/16  Yes Clemetine Beverley Might, She appears well-developed and well-nourished. No distress. Over nourished   HENT:   Mouth/Throat: Oropharynx is clear and moist.   Eyes: Conjunctivae are normal. No scleral icterus. Neck: Normal range of motion. Neck supple. Cardiovascular: Normal rate and regular rhythm. Pulses:       Dorsalis pedis pulses are 2+ on the right side, and 2+ on the left side. Pulmonary/Chest: Effort normal and breath sounds normal. She has no wheezes. She has no rales. Abdominal: Soft. Bowel sounds are normal. There is tenderness. Obese abdomen   Musculoskeletal: She exhibits no edema. Right foot: There is normal range of motion and no deformity. Left foot: There is normal range of motion and no deformity. Feet:   Right Foot:   Protective Sensation: 5 sites tested. 2 sites sensed. Skin Integrity: Positive for warmth and callus. Negative for ulcer, blister, skin breakdown, erythema or dry skin. Left Foot:   Protective Sensation: 5 sites tested. 2 sites sensed. Skin Integrity: Positive for warmth and callus. Negative for ulcer, blister, skin breakdown, erythema or dry skin. Lymphadenopathy:     She has no cervical adenopathy. Neurological: She is alert and oriented to person, place, and time. Skin: Skin is warm and dry. Psychiatric: She has a normal mood and affect. Her behavior is normal.   Nursing note and vitals reviewed.       Data:     Lab Results   Component Value Date     11/13/2018    K 4.4 11/13/2018    CL 91 11/13/2018    CO2 24 11/13/2018    BUN 14 11/13/2018    CREATININE 0.62 11/13/2018    GLUCOSE 416 11/13/2018    GLUCOSE 181 02/16/2012    PROT 7.3 05/08/2018    LABALBU 3.8 05/08/2018    LABALBU 4.2 12/05/2011    BILITOT 0.29 05/08/2018    ALKPHOS 104 05/08/2018    AST 18 05/08/2018    ALT 21 05/08/2018     Lab Results   Component Value Date    WBC 9.9 08/22/2018    RBC 5.26 08/22/2018    RBC 3.88 12/05/2011    HGB 14.2 08/22/2018    HCT 42.8 08/22/2018    MCV 81.4 15

## 2023-10-02 NOTE — ED ADULT NURSE NOTE - NSFALLRISKINTERV_ED_ALL_ED
Assistance OOB with selected safe patient handling equipment if applicable/Assistance with ambulation/Communicate fall risk and risk factors to all staff, patient, and family/Monitor gait and stability/Monitor for mental status changes and reorient to person, place, and time, as needed/Move patient closer to nursing station/within visual sight of ED staff/Provide visual cue: yellow wristband, yellow gown, etc/Reinforce activity limits and safety measures with patient and family/Toileting schedule using arm’s reach rule for commode and bathroom/Use of alarms - bed, stretcher, chair and/or video monitoring/Call bell, personal items and telephone in reach/Instruct patient to call for assistance before getting out of bed/chair/stretcher/Non-slip footwear applied when patient is off stretcher/[place] to call system/Physically safe environment - no spills, clutter or unnecessary equipment/Purposeful Proactive Rounding/Room/bathroom lighting operational, light cord in reach Assistance OOB with selected safe patient handling equipment if applicable/Assistance with ambulation/Communicate fall risk and risk factors to all staff, patient, and family/Monitor gait and stability/Monitor for mental status changes and reorient to person, place, and time, as needed/Move patient closer to nursing station/within visual sight of ED staff/Provide visual cue: yellow wristband, yellow gown, etc/Reinforce activity limits and safety measures with patient and family/Toileting schedule using arm’s reach rule for commode and bathroom/Use of alarms - bed, stretcher, chair and/or video monitoring/Call bell, personal items and telephone in reach/Instruct patient to call for assistance before getting out of bed/chair/stretcher/Non-slip footwear applied when patient is off stretcher/[place] to call system/Physically safe environment - no spills, clutter or unnecessary equipment/Purposeful Proactive Rounding/Room/bathroom lighting operational, light cord in reach Assistance OOB with selected safe patient handling equipment if applicable/Assistance with ambulation/Communicate fall risk and risk factors to all staff, patient, and family/Monitor gait and stability/Monitor for mental status changes and reorient to person, place, and time, as needed/Move patient closer to nursing station/within visual sight of ED staff/Provide visual cue: yellow wristband, yellow gown, etc/Reinforce activity limits and safety measures with patient and family/Toileting schedule using arm’s reach rule for commode and bathroom/Use of alarms - bed, stretcher, chair and/or video monitoring/Call bell, personal items and telephone in reach/Instruct patient to call for assistance before getting out of bed/chair/stretcher/Non-slip footwear applied when patient is off stretcher/[place] to call system/Physically safe environment - no spills, clutter or unnecessary equipment/Purposeful Proactive Rounding/Room/bathroom lighting operational, light cord in reach

## 2023-10-02 NOTE — ED PROVIDER NOTE - CARE PROVIDERS DIRECT ADDRESSES
,[email or web address]

## 2023-10-02 NOTE — ED PROVIDER NOTE - PROGRESS NOTE DETAILS
peg tube flushed and working. I have discussed with the patient about the ED workup, lab results, diagnostics results, plan for discharge home, need for follow-up with primary care physician/specialists, and return precautions. At this time, the patient does not require further workup in the ED. The patient is subjectively feeling better and would like to be discharged home. The patient had the opportunity to ask questions and I have answered all inquiries. The patient verbalizes understanding and agreement with the plan. The patient is hemodynamically stable, clinically well-appearing, ambulatory, mentating well and ready for discharge home.

## 2023-10-02 NOTE — ED PROVIDER NOTE - PATIENT PORTAL LINK FT
Cyclosporine Pregnancy And Lactation Text: This medication is Pregnancy Category C and it isn't know if it is safe during pregnancy. This medication is excreted in breast milk. You can access the FollowMyHealth Patient Portal offered by [facility] by registering at the following website: [email or web address]. By joining [employer]’s FollowMyHealth portal, you will also be able to view your health information using other applications (apps) compatible with our system. You can access the FollowMyHealth Patient Portal offered by [facility] by registering at the following website: [email or web address]. By joining [employer]’s FollowMyHealth portal, you will also be able to view your health information using other applications (apps) compatible with our system. You can access the FollowMyHealth Patient Portal offered by [facility] by registering at the following website: [email or web address]. By joining [employer]’s FollowMyHealth portal, you will also be able to view your health information using other applications (apps) compatible with our system.

## 2023-10-02 NOTE — ED PROVIDER NOTE - CARE PROVIDER_API CALL
London Lopez  Cardiovascular Disease  [address] [place] [address]  Phone: [phone]  Fax: [phone]  Follow Up Time:    London Lopez  Cardiovascular Disease  [address] [place] [address]  Phone: [phone]  Fax: [phone]  Follow Up Time:    London Lopez  Cardiovascular Disease  [address] [place] [address]  Phone: [phone]  Fax: [phone]  Follow Up Time:

## 2023-10-02 NOTE — ED PROVIDER NOTE - PHYSICAL EXAMINATION
VITAL SIGNS: I have reviewed nursing notes and confirm.   GEN: Well-developed; well-nourished; in no acute distress. Speaking full sentences.  SKIN: Warm, pink, no rash, no diaphoresis, no cyanosis, well perfused.   HEAD: Normocephalic; atraumatic.   NECK: Supple   EYES:   Extra-ocular movements intact bilaterally.  ENT: No nasal discharge; airway clear. Trachea is midline. ORAL: No oropharyngeal exudates or erythema. Normal dentition.  CV: Regular rate and rhythm. S1, S2 normal; no murmurs, gallops, or rubs. Distal pulses intact 2+ throughout.  RESP: CTA bilaterally. No wheezes, rales, or rhonchi.  ABD: Normal bowel sounds, soft, non-distended, non-tender, no rebound, no guarding, no rigidity, no hepatosplenomegaly. No CVA tenderness bilaterally.  (+) PEG TUBE in place.  MSK: Contracted extremities throughout. No apparent joint or muscular pain throughout.    NEURO: Alert & oriented x name, sitting in wheelchair.  PSYCH: Cooperative, appropriate.
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
## 2023-10-05 NOTE — H&P ADULT - NSHPSOCIALHISTORY_GEN_ALL_CORE
Lives at home with   Gets around with electric rollator  Denies smoking, illicit drug use, EtOH  Needs help with all ADLs  Has 24-7 HHA

## 2023-10-05 NOTE — ED ADULT TRIAGE NOTE - CHIEF COMPLAINT QUOTE
Patient c/o right foot hallux injury and clogged PEG tube with redness at site. Patient denies fever. Patient endorses pain at PEG site.

## 2023-10-05 NOTE — CONSULT NOTE ADULT - ASSESSMENT
77 year old F PMH HTN, DM type 2 (not on insulin), CKD stage 3 on HD MWF, CVA, dementia coming to the ED for clogged feeding tube. Patient had CVA in 2022 and cause nerve paralysis and difficulty swallowing therefore PEG was placed in Nov 2022    GI consultation for PEG tube malfunction.
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
## 2023-10-05 NOTE — ED ADULT TRIAGE NOTE - TEMPERATURE IN FAHRENHEIT (DEGREES F)
HPI:  75 y/o M w/ prostate cancer s/p prostatectomy, GBM (dx 2018, s/p surgery, s/p temozolamide monotherapy followed by temozolamide + bevacizumab started 2019) admitted for DVT/PE now on anticogulation a/w rash on his back for 3 weeks. Pt reports moderate associated pruritus. Has not tried treatment for this rash. He believes the rash started soon after the initiation of bevacizumab.    PAST MEDICAL & SURGICAL HISTORY:  Fall, sequela  Bilateral hearing loss, unspecified hearing loss type   activity: [employer] -    [place]  Type 2 diabetes mellitus without complication, without long-term current use of insulin: stop oral meds  3 weeks ago  Glioblastoma: biopsy 2018  surgery  6 weeks radiation  35 days of oral chemotherapy last 3 weeks  Basal cell carcinoma (BCC) of left lower leg: s/p resection  right lower leg top  left lower leg  Prostate cancer: s/p radical prostatectomy   HLD (hyperlipidemia)  HTN (hypertension)  S/P colonoscopic polypectomy: 3 years benign polyps  S/P tonsillectomy: age 26  H/O bilateral cataract extraction: 3-4 years ago  S/P prostatectomy:   S/P brain surgery: 2018  Lipoma of neck: s/p resection at age 26  Basal cell carcinoma (BCC) of lower leg: s/p resection  History of tonsillectomy: at age 26      Review of Systems:  Constitutional: denies fevers, chills  HEENT: denies odynophagia or dysphagia  Cardiovascular: denies palpitations  Respiratory: denies SOB, wheezing  Gastrointestinal: denies N/V/D, abdominal pain  : denies dysuria  MSK: denies weakness, joint pain  Skin: denies new rashes or masses unless otherwise specified in hpi    MEDICATIONS  (STANDING):  docusate sodium 100 milliGRAM(s) Oral two times a day  enoxaparin Injectable 90 milliGRAM(s) SubCutaneous every 12 hours  oxyCODONE  ER Tablet 15 milliGRAM(s) Oral every 12 hours  senna 2 Tablet(s) Oral at bedtime    MEDICATIONS  (PRN):  bisacodyl 5 milliGRAM(s) Oral every 12 hours PRN Constipation  diphenhydrAMINE 25 milliGRAM(s) Oral every 4 hours PRN Rash and/or Itching      Allergies    No Known Allergies    Intolerances        SOCIAL HISTORY: denies drug use    FAMILY HISTORY:  Family history of diabetes mellitus: Father   Family history of prostate cancer in father:   Family history of breast cancer in mother (Sibling): Mother and sister  Family history of lung cancer: mother , long standing smoker      Vital Signs Last 24 Hrs  T(C): 36.6 (2019 12:10), Max: 36.8 (2019 23:01)  T(F): 97.9 (2019 12:10), Max: 98.2 (2019 23:01)  HR: 90 (2019 13:14) (82 - 100)  BP: 135/71 (2019 13:14) (135/71 - 170/87)  BP(mean): --  RR: 17 (2019 13:14) (17 - 18)  SpO2: 93% (2019 13:14) (71% - 95%)    Physical Exam:  The patient was alert and oriented X 3, well nourished, and in no apparent distress. Oropharynx showed no ulcerations. There was no visible lymphadenopathy. Conjunctiva were non-injected. There was no clubbing or edema of extremities.    The scalp, hair, face, eyebrows, lips, oropharynx , neck, chest, back, buttocks, extremities X 4, hands, feet, nails were examined. There was no hyperhidrosis or bromhidrosis.     The following lesions are noted:   - Pink papules and pustules scattered on back primarily, with less involvement of abdomen. several on lower back w/ overlying hemorrhagic crusting    LABS:                        9.6    6.76  )-----------( 479      ( 2019 10:26 )             29.9     04-18    139  |  103  |  14  ----------------------------<  112<H>  3.9   |  27  |  0.89    Ca    9.1      2019 06:06            RADIOLOGY & ADDITIONAL STUDIES: no relevant studies 97.2

## 2023-10-05 NOTE — H&P ADULT - NSHPPHYSICALEXAM_GEN_ALL_CORE
T(C): 36.6 (10-05-23 @ 13:09), Max: 36.6 (10-05-23 @ 13:09)  HR: 77 (10-05-23 @ 13:09) (77 - 92)  BP: 165/81 (10-05-23 @ 13:09) (137/98 - 165/81)  RR: 18 (10-05-23 @ 13:09) (18 - 19)  SpO2: 97% (10-05-23 @ 13:09) (95% - 97%)    GENERAL: patient appears well, no acute distress, appropriate, pleasant, LLE in splint  EYES: sclera clear, no exudates  ENMT: oropharynx clear without erythema, no exudates, moist mucous membranes  NECK: supple, soft, no thyromegaly noted  LUNGS: good air entry bilaterally, clear to auscultation, symmetric breath sounds, no wheezing or rhonchi appreciated  HEART: soft S1/S2, regular rate and rhythm, no murmurs noted, no lower extremity edema  GASTROINTESTINAL: abdomen is soft, nontender, nondistended, normoactive bowel sounds, no palpable masses, + PEG with surrounding erythema, slight tenderness to palpation  INTEGUMENT: warm and perfused  MUSCULOSKELETAL: no clubbing or cyanosis, no obvious deformity  NEUROLOGIC: awake, alert, oriented x3, b/l UE 4-5/5 strength, lower extremities weak, unable to lift them  PSYCHIATRIC: mood is good, affect is congruent, linear and logical thought process  HEME/LYMPH: no palpable supraclavicular nodules, no obvious ecchymosis or petechiae

## 2023-10-05 NOTE — ED ADULT NURSE NOTE - NSFALLHARMRISKINTERV_ED_ALL_ED

## 2023-10-05 NOTE — ED ADULT NURSE NOTE - NSICDXFAMILYHX_GEN_ALL_CORE_FT
FAMILY HISTORY:  Sibling  Still living? Unknown  FHx: breast cancer, Age at diagnosis: Age Unknown
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
## 2023-10-05 NOTE — ED ADULT NURSE NOTE - OBJECTIVE STATEMENT
Assumed pt cre for a 77 yr old female complaining of left foot pain and peg tube troubles. Pt reports she was at dialysis and hurt her left leg while navigating her automatic wheel chair. Pt also reports clogged Peg tube. As per Aid tube was functioning yesterday and today it stopped working. MD at bedside. No further complaints. Pt note to have redness to peg ostomy, tenderness around area. Pt also noted to have small laceration under left big toe. PA bandaged toe. IV established. labs collected. Pending imagining. Awaiting further disposition.     Sacrum noted to have blanchabale redness.

## 2023-10-05 NOTE — ED PROVIDER NOTE - PHYSICAL EXAMINATION
General:     NAD   Eyes: PERRL  Head:     NC/AT, oral mucosa moist  Neck:     trachea midline  Lungs:     CTA b/l  CVS:     RRR  Abd:     s/nt/nd, erythema surrounding peg site wo discharge or fluctuance  Ext:   LLE swelling to left foot with skin tear to lateral great toe. no active bleeding. LUE: thrill  Neuro: AAOx3

## 2023-10-05 NOTE — H&P ADULT - NSICDXFAMILYHX_GEN_ALL_CORE_FT
FAMILY HISTORY:  Sibling  Still living? Unknown  FHx: breast cancer, Age at diagnosis: Age Unknown    
no

## 2023-10-05 NOTE — ED PROVIDER NOTE - CARE PLAN
Principal Discharge DX:	PEG tube malfunction   1 Principal Discharge DX:	PEG tube malfunction  Secondary Diagnosis:	Metatarsal bone fracture

## 2023-10-05 NOTE — H&P ADULT - NSHPREVIEWOFSYSTEMS_GEN_ALL_CORE
CONSTITUTIONAL: denies fever, chills, fatigue, weakness  HEENT: denies blurred vision, sore throat  SKIN: admits to redness around PEG site, no other new lesions  CARDIOVASCULAR: denies chest pain, chest pressure, palpitations  RESPIRATORY: denies shortness of breath, sputum production  GASTROINTESTINAL: denies nausea, vomiting, diarrhea, abdominal pain, admits to pain around PEG site  GENITOURINARY: denies dysuria, discharge  NEUROLOGICAL: denies numbness, headache, focal weakness  MUSCULOSKELETAL: admits to L foot pain, denies muscle aches  HEMATOLOGIC: denies gross bleeding, bruising  LYMPHATICS: denies enlarged lymph nodes, extremity swelling  PSYCHIATRIC: denies recent changes in anxiety, depression  ENDOCRINOLOGIC: denies sweating, cold or heat intolerance

## 2023-10-05 NOTE — H&P ADULT - ASSESSMENT
77 year old F PMH HTN, DM type 2 (not on insulin), CKD stage 3 on HD MWF, CVA, dementia coming to the ED for clogged feeding tube admitted for malfunction and infection of PEG tube    #malfunction of PEG tube  #cellulitis around PEG tube  - admit to medicine  - patient with history of multiple clogging of tube, ED unable to unclog PEG tube  - will keep NPO for now and start IVF for hydration  - s/p gentamicin in the ED, will continue with rocephin  - follow up BCx  - unable to tolerate PO and unable to administer meds via PEG therefore needs admission  - GI consulted by the ED, follow up recs    #metatarsal fracture  - xray with fracture deformities through the neck of the third and fourth metatarsals of uncertain age  - splint placed by ED provider  - follow up outpatient with podiatry    #dementia   #mood disorder  - home meds: diazepam 5mg BID, sertraline 100mg BID, quetiapine 25mg daily  - restart home meds once PEG functioning    #CVA  - recent CVA 11/2022 with nerve palsy  - s/p PEG- restart feeds once able to  - home med: plavix 75mg daily and atorvastatin 10mg daily  - restart meds once PEG functioning     #DM   - not on any meds  - follow up HbA1C  - ISS and POCT in the meantime while NPO    #CKD on HD  - patient on HD MWF  - nephro consulted, discussed with Dr. May    #DVT ppx  - heparin 5000U TID    discussed with HHA at bedside as well as son Jono over the phone and  Alvin     ----------------  addendum:    discussed with GI NP Romulo and Dr. Barbosa- able to unclog PEG tube at bedside and now functioning. Given that this is patient's 2nd visit for same problem within 4 days, advised patient and family to stay overnight to ensure PEG working well and will start on home meds and feeds. If working today and tomorrow, can likely be discharged home tomorrow after HD session.     77 year old F PMH HTN, DM type 2 (not on insulin), CKD stage 3 on HD MWF, CVA, dementia coming to the ED for clogged feeding tube admitted for malfunction and infection of PEG tube    #malfunction of PEG tube  #cellulitis around PEG tube  - admit to medicine  - patient with history of multiple clogging of tube, ED unable to unclog PEG tube  - will keep NPO for now and start IVF for hydration  - s/p gentamicin and vanco in the ED, will continue with rocephin  - follow up BCx  - unable to tolerate PO and unable to administer meds via PEG therefore needs admission  - GI consulted by the ED, follow up recs    #metatarsal fracture  - xray with fracture deformities through the neck of the third and fourth metatarsals of uncertain age  - splint placed by ED provider  - follow up outpatient with podiatry    #dementia   #mood disorder  - home meds: diazepam 5mg BID, sertraline 100mg BID, quetiapine 25mg daily  - restart home meds once PEG functioning    #CVA  - recent CVA 11/2022 with nerve palsy  - s/p PEG- restart feeds once able to  - home med: plavix 75mg daily and atorvastatin 10mg daily  - restart meds once PEG functioning     #DM   - not on any meds  - follow up HbA1C  - ISS and POCT in the meantime while NPO    #CKD on HD  - patient on HD MWF  - nephro consulted, discussed with Dr. May    #DVT ppx  - heparin 5000U TID    discussed with HHA at bedside as well as son Jono over the phone and  Alvin     ----------------  addendum:    discussed with GI NP Romulo and Dr. Barbosa- able to unclog PEG tube at bedside and now functioning. Given that this is patient's 2nd visit for same problem within 4 days, advised patient and family to stay overnight to ensure PEG working well and will start on home meds and feeds. If working today and tomorrow, can likely be discharged home tomorrow after HD session.

## 2023-10-05 NOTE — CONSULT NOTE ADULT - PROBLEM SELECTOR RECOMMENDATION 9
Peg tube Flushed and able to unclogged at bedside, patient tolerated procedure.   Educated the care giver importance of flushing the PEG tube, after feed and medication.   Avoid putting the Gauze between the skin and PEG hub.   Clean PEG site routinely   Check residuals  Keep HOB elevated for feeding.

## 2023-10-05 NOTE — ED PROVIDER NOTE - NS ED ATTENDING STATEMENT MOD
Attending with This was a shared visit with the BONNY. I reviewed and verified the documentation and independently performed the documented:

## 2023-10-05 NOTE — ED ADULT NURSE NOTE - NS PRO AD NO ADVANCE DIRECTIVE
CONI ESPARZA 74y Female  MRN#: [id number]   Hospital Day: 8d    SUBJECTIVE  Patient is a 74y old Female who presents with a chief complaint of Right sided facial numbness x 1 day, complete AV block (23 Aug 2022 11:51)  Currently admitted to medicine with the primary diagnosis of Brain TIA      INTERVAL HPI AND OVERNIGHT EVENTS:  Patient was examined and seen at bedside. This afternoon she is resting comfortably in bed after returning from her PPM placement.    REVIEW OF SYMPTOMS:  Denies all except back, butt, and left sided thigh pain.    OBJECTIVE  PAST MEDICAL & SURGICAL HISTORY  Chronic atrial fibrillation  herniated disc    Diabetes    Hypertension    COPD (chronic obstructive pulmonary disease)    Anxiety    Cervical spine pain    Atrial fibrillation    Tremor    Agoraphobia    S/P appendectomy    H/O: hysterectomy    Previous back surgery      ALLERGIES:  IV Contrast (Rash; Flushing; Hives)  Percocet 10/325 (Short breath)  Percodan (Hives)  strawberry (Unknown)    MEDICATIONS:  STANDING MEDICATIONS  atorvastatin 40 milliGRAM(s) Oral at bedtime  bisacodyl 5 milliGRAM(s) Oral at bedtime  budesonide 160 MICROgram(s)/formoterol 4.5 MICROgram(s) Inhaler 2 Puff(s) Inhalation two times a day  dextrose 5%. 1000 milliLiter(s) IV Continuous <Continuous>  dextrose 50% Injectable 25 Gram(s) IV Push once  furosemide    Tablet 40 milliGRAM(s) Oral daily  gabapentin 300 milliGRAM(s) Oral every 8 hours  glucagon  Injectable 1 milliGRAM(s) IntraMuscular once  heparin  Infusion.  Unit(s)/Hr IV Continuous <Continuous>  insulin lispro (ADMELOG) corrective regimen sliding scale   SubCutaneous three times a day before meals  levothyroxine 50 MICROGram(s) Oral daily  pantoprazole    Tablet 40 milliGRAM(s) Oral before breakfast  primidone 50 milliGRAM(s) Oral at bedtime  tiotropium 18 MICROgram(s) Capsule 1 Capsule(s) Inhalation daily  vancomycin  IVPB 1000 milliGRAM(s) IV Intermittent every 12 hours    PRN MEDICATIONS  acetaminophen  300 mG/codeine 30 mG 1 Tablet(s) Oral every 6 hours PRN  dextrose Oral Gel 15 Gram(s) Oral once PRN  heparin   Injectable 6500 Unit(s) IV Push every 6 hours PRN  heparin   Injectable 3000 Unit(s) IV Push every 6 hours PRN  meclizine 25 milliGRAM(s) Oral every 8 hours PRN  melatonin 3 milliGRAM(s) Oral at bedtime PRN  ondansetron Injectable 4 milliGRAM(s) IV Push every 8 hours PRN      VITAL SIGNS: Last 24 Hours  T(C): 35.6 (23 Aug 2022 14:19), Max: 36.1 (23 Aug 2022 05:48)  T(F): 96 (23 Aug 2022 14:19), Max: 97 (23 Aug 2022 05:48)  HR: 92 (23 Aug 2022 14:19) (62 - 92)  BP: 137/70 (23 Aug 2022 14:19) (124/59 - 137/70)  BP(mean): --  RR: 18 (23 Aug 2022 14:19) (18 - 18)  SpO2: --    LABS:                        8.8    7.98  )-----------( 350      ( 22 Aug 2022 21:43 )             31.2     08-22    134<L>  |  91<L>  |  32<H>  ----------------------------<  148<H>  4.3   |  33<H>  |  1.1    Ca    9.0      22 Aug 2022 21:43    TPro  6.7  /  Alb  3.7  /  TBili  <0.2  /  DBili  x   /  AST  20  /  ALT  11  /  AlkPhos  146<H>  08-22    PT/INR - ( 22 Aug 2022 21:43 )   PT: 12.80 sec;   INR: 1.11 ratio         PTT - ( 23 Aug 2022 12:57 )  PTT:30.9 sec      PHYSICAL EXAM:  CONSTITUTIONAL: No acute distress, well-developed, well-groomed  HEAD: Atraumatic, normocephalic  EYES: EOM intact, PERRLA, conjunctiva and sclera clear  ENT: Moist mucous membranes  PULMONARY: Clear to auscultation bilaterally; no wheezes, rales, or rhonchi  CARDIOVASCULAR: Regular rate and rhythm; no murmurs, rubs, or gallops  GASTROINTESTINAL: Soft, non-tender, non-distended; bowel sounds present  SKIN: No rashes or lesions; warm and dry    ASSESSMENT & PLAN:  74F w/PMHx of AFIB on DOAC, Parkinson's, HTN/HLD, DM2 presents to complaints of right sided facial numbness found to have Mobitiz type 1 heart block and admitted for PPM.     # Complete Heart block with episodes of idioventricular   # s/p dual chamber pacemaker implant ([employer], Non-dependent)  -[facility]       Plan:  - Overnight prophylactic pressure dressing  - Hold Xarelto for 48 hours  - CXR and Interrogation in am  - Anticipate DC from EP standpoint in am if stable.    #AFIB  -No AFib on recent EKG     Plan:   -ccb beta blocker held d/t heart block   -cardiology noted   -restart beta blocker when cleared by EP    #Vertigo with right sided facial numbness  - Neurology following Head non-con, MRA H/N w/o (no C/I) unable to complete    Plan:  - Meclizine 25mg PO TID PRN  - follow up for disposition     #COPD  - not in acute exacerbation    Plan:  - c/w LABA/LAMA/ICS  - Albutero PRN    #HTN/HLD  -Vital signs for the last 24 hours (112/59 - 153/65)    Plan:  - c/w home meds  - DASH Diet    #DM2  -Sugars for the last 24 hours 116-220's    Plan:  - hold PO meds  - FS ac TID w/ss coverage    #Parkinon's disease  - c/w Primidone  - fall precautions  - PT/OT when stable      #Misc  - DVT Prophylaxis: Heparin  - GI Prophylaxis: None  - Diet: Dash carb consistent  - Activity: As tolerated  - IV Fluids: None  - Code Status: DNR    Dispo: d/c tomorrow per care note patient would like STR No

## 2023-10-05 NOTE — ED PROVIDER NOTE - OBJECTIVE STATEMENT
PEG tube clogged this morning. On Monday She came to [facility] for the same issue of a clogged PEG tube. The tube was unclogged and pt was discharged Monday. Pt complains of pain around the tube insertion and upon manipulation of the tube. Tube requires change every 6 mo according to caregiver. Is not able to take any of her morning medications due to obstruction of PEG tube.  cut her right lateral big toe yesterday and the bleed is unable to be controlled. Currently has gauze and tape wrapped around the cut.   Also has swelling and pain of her right foot. Pt is unaware of how she hurt her foot, suspects that she injured it at [facility]. 77F with PMhx HTN, DM, CKD on HD M/W/F, CVA c/w esophageal paralysis s/p g-tube presents for g-tube malfunction. PEG tube clogged this morning. pt seen 3 days ago in ED for same complainant but wo pain. ED able to unclog. around the tube insertion and upon manipulation of the tube. Pt also has a cut on her right lateral big toe yesterday and the bleed is unable to be controlled. Also has swelling and pain of her right foot. pt states that her foot was accidently run over by a wheelchair  denies fever, chills. n/v/d, abd pain 77F with PMhx HTN, DM, CKD on HD M/W/F, CVA c/w esophageal paralysis s/p g-tube presents for g-tube malfunction. PEG tube clogged this morning. pt seen 3 days ago in ED for same complainant but wo pain. ED able to unclog. around the tube insertion and upon manipulation of the tube. Pt also has a cut on her left lateral big toe yesterday and the bleed is unable to be controlled. Also has swelling and pain of her left foot. pt states that her foot was accidently run over by a wheelchair  denies fever, chills. n/v/d, abd pain

## 2023-10-06 NOTE — DIETITIAN INITIAL EVALUATION ADULT - PERTINENT MEDS FT
MEDICATIONS  (STANDING):  atorvastatin 10 milliGRAM(s) Oral at bedtime  cefTRIAXone   IVPB      cefTRIAXone   IVPB 1000 milliGRAM(s) IV Intermittent every 24 hours  cholecalciferol 1000 Unit(s) Oral daily  clopidogrel Tablet 75 milliGRAM(s) Oral daily  dextrose 5%. 1000 milliLiter(s) (50 mL/Hr) IV Continuous <Continuous>  dextrose 5%. 1000 milliLiter(s) (100 mL/Hr) IV Continuous <Continuous>  dextrose 50% Injectable 25 Gram(s) IV Push once  dextrose 50% Injectable 25 Gram(s) IV Push once  dextrose 50% Injectable 12.5 Gram(s) IV Push once  diazepam    Tablet 5 milliGRAM(s) Oral two times a day  famotidine    Tablet 20 milliGRAM(s) Oral daily  glucagon  Injectable 1 milliGRAM(s) IntraMuscular once  heparin   Injectable 5000 Unit(s) SubCutaneous every 8 hours  insulin lispro (ADMELOG) corrective regimen sliding scale   SubCutaneous every 6 hours  QUEtiapine 25 milliGRAM(s) Oral daily  sertraline 100 milliGRAM(s) Oral two times a day    MEDICATIONS  (PRN):  amLODIPine   Tablet 2.5 milliGRAM(s) Oral daily PRN > 130  dextrose Oral Gel 15 Gram(s) Oral once PRN Blood Glucose LESS THAN 70 milliGRAM(s)/deciliter

## 2023-10-06 NOTE — DIETITIAN INITIAL EVALUATION ADULT - ADD RECOMMEND
1. Recommend continue current nutrition plan of care as tolerated   2. Monitor daily tube feeding regimen, GI tolerance, labs, weights, skin integrity, & BM regularity

## 2023-10-06 NOTE — PATIENT PROFILE ADULT - NSTRANSFERBELONGINGSDISPO_GEN_A_NUR
PT Name: Stuart Guevara  MR #: [id number]     DOCUMENTATION CLARIFICATION     CDS/: Noemi Dockery RN CDIS            Contact information: [email or web address]    This form is a permanent document in the medical record.     Query Date: November 22, 2022    By submitting this query, we are merely seeking further clarification of documentation.  Please utilize your independent clinical judgment when addressing the question(s) below.    The Medical Record contains the following:   Indicators   Supporting Clinical Findings Location in Medical Record    Non-blanchable erythema/redness      Ulcer/Injury/Skin Breakdown      Deep Tissue Injury      Wound care consult Buttock maceration has stablilize.   Most of the peeling skin has resolved-  combined to 2ulcers now noted    Altered Skin Integrity 11/05/22 1253 posterior Sacral spine #1 Shearing Partial thickness tissue loss. Shallow open ulcer with a red or pink wound bed, without slough. Intact or Open/Ruptured Serum-filled blister.   Date First Assessed/Time First Assessed: 11/05/22 1253   Altered Skin Integrity Present on Admission: suspected hospital acquired  Orientation: posterior  Location: Sacral spine  Wound Number: #1  Is this injury device related?: No  Primary Wound Type...   Wound Image    (photo malfx)   Description of Altered Skin Integrity Partial thickness tissue loss. Shallow open ulcer with a red or pink wound bed, without slough. Intact or Open/Ruptured Serum-filled blister.   Dressing Appearance Moist drainage   Drainage Amount Small   Drainage Characteristics/Odor Sanguineous;No odor   Appearance Pink;Red;White;Moist  (2 distinct ulcers now)   Tissue loss description Partial thickness   Red (%), Wound Tissue Color 90 %   Yellow (%), Wound Tissue Color 10 %   Periwound Area Dry   Wound Edges Irregular   Wound Length (cm) 10 cm  (both lesions)   Wound Width (cm) 10 cm   Wound Surface Area (cm^2) 100 cm^2   Care Cleansed with:;Wound  cleanser;Applied:;Skin Barrier   Dressing Absorptive Pad    Wound care note 11/15       Wound care note 11/15     Acute/Chronic Illness      Medication/Treatment     x Other  Scan dated 11/7      The clinical guidelines noted are only a system guideline. It does not replace the providers clinical judgment.    Per the National Pressure Injury Advisory Panel:   A pressure injury is localized damage to the skin and underlying soft tissue usually over a bony prominence or related to a medical or other device. The injury can present as intact skin or an open ulcer and may be painful. The injury occurs as a result of intense and/or prolonged pressure or pressure in combination with shear. The tolerance of soft tissue for pressure and shear may also be affected by microclimate, nutrition, perfusion, co-morbidities and condition of the soft tissue.       Stage 1 Pressure Injury:  Intact skin with a localized area of non-blanchable erythema, which may appear differently in darkly pigmented skin. Color changes do not include purple or maroon discoloration; these may indicate deep tissue pressure injury.    Stage 2 Pressure Injury:  Partial-thickness loss of skin with exposed dermis. The wound bed is viable, pink or red, moist, and may also present as an intact or ruptured serum-filled blister.    Stage 3 Pressure Injury:  Full-thickness loss of skin, in which adipose (fat) is visible in the ulcer and granulation tissue and epibole (rolled wound edges) are often present. Slough and/or eschar may be visible. Undermining and tunneling may occur.    Stage 4 Pressure Injury:  Full-thickness skin and tissue loss with exposed or directly palpable fascia, muscle, tendon, ligament, cartilage or bone in the ulcer. Slough and/or eschar may be visible. Epibole (rolled edges), undermining and/or tunneling often occur.    Unstageable Pressure Injury:  Full-thickness skin and tissue loss in which the extent of tissue damage within the  ulcer cannot be confirmed because it is obscured by slough or eschar. If slough or eschar is removed, a Stage 3 or Stage 4 pressure injury will be revealed.    Deep Tissue Pressure Injury:  Intact or non-intact skin with localized area of persistent non-blanchable deep red, maroon, purple discoloration or epidermal separation revealing a dark wound bed or blood filled blister. This injury results from intense and/or prolonged pressure and shear forces at the bone-muscle interface. The wound may evolve rapidly to reveal the actual extent of tissue injury, or may resolve without tissue loss. If necrotic tissue, subcutaneous tissue, granulation tissue, fascia, muscle or other underlying structures are visible, this indicates a full thickness pressure injury (Unstageable, Stage 3 or Stage 4). Do not use DTPI to describe vascular, traumatic, neuropathic, or dermatologic conditions.   Medical Device Related Pressure Injury: This describes an etiology. Medical device related pressure injuries result from the use of devices designed and applied for diagnostic or therapeutic purposes. The resultant pressure injury generally conforms to the pattern or shape of the device. The injury should be staged using the staging system.    Mucosal Membrane Pressure Injury: Mucosal membrane pressure injury is found on mucous membranes with a history of a medical device in use at the location of the injury. Due to the anatomy of the tissue these ulcers cannot be staged.       Provider, please provide the integumentary diagnosis related to the documentation of (Sacral ulcer ):     [   ] Pressure Injury/Decubitus Ulcer, Stage 2   [   ] Pressure Injury/Decubitus Ulcer, Stage 3   [   ] Non-pressure ulcer, skin breakdown only   [   ] Non-pressure ulcer, exposed fat layer   [   ] Other Integumentary Diagnosis (please specify):______________   [  ] Clinically Undetermined       Present on admission (POA) status:    [   ] Yes (Y)   [   ] No (N)    [   ] Documentation insufficient to determine if condition is POA (U)   [ x ] Clinically Undetermined (W)       Please document in your progress notes daily for the duration of treatment until resolved and include in your discharge summary.    Reference:    SONIA Lopez., ANDREE William., Goldberg, M., SONIA Montano, SONIA Ramírez, & MAGGI Alonso. (2016). Revised National Pressure Ulcer Advisory Panel Pressure Injury Staging System: Revised Pressure Injury Staging System. J Wound Ostomy Continence Nurs, 43(6), 585-597. doi:[email or web address]/[email or web address]    Form No.[id number]   pt has an eye glasses and scooter./with patient

## 2023-10-06 NOTE — PATIENT PROFILE ADULT - FALL HARM RISK - HARM RISK INTERVENTIONS

## 2023-10-06 NOTE — DISCHARGE NOTE PROVIDER - DISCHARGE SERVICE FOR PATIENT
Detail Level: Detailed on the discharge service for the patient. I have reviewed and made amendments to the documentation where necessary. Detail Level: Simple

## 2023-10-06 NOTE — CONSULT NOTE ADULT - SUBJECTIVE AND OBJECTIVE BOX
NEPHROLOGY CONSULTATION    CHIEF COMPLAINT: PEG mal-fx    HPI:  Pt is 76 yo F PMH HTN, DM type 2 (not on insulin), ESRD on HD MWF, CVA, dementia coming to the ED for clogged feeding tube. Patient had CVA in 2022 that caused difficulty swallowing therefore PEG was placed in Nov 2022. As per patient, PEG gets clogged every few months, most recently was a few days ago for which she came to the ED and after flush it started working. On 10/4 noted redness around PEG site, yesterday redness was worse and unable to administer feeds or medications via PEG therefore patient came to the ED. Providers in the ED unable to unclog tube and could not remove it either. No abd pain, nausea or vomiting, fever or chills. No CP, SOB, due for HD today.    ROS:  as above    Allergies:  sulfa drugs (Rash)    PAST MEDICAL & SURGICAL HISTORY:  ESRD (end stage renal disease) on dialysis  Hypertension  CVA (cerebrovascular accident)  Feeding by G-tube    SOCIAL HISTORY:  negative    FAMILY HISTORY:  FHx: breast cancer (Sibling)    MEDICATIONS  (STANDING):  atorvastatin 10 milliGRAM(s) Oral at bedtime  cefTRIAXone   IVPB      cefTRIAXone   IVPB 1000 milliGRAM(s) IV Intermittent every 24 hours  cholecalciferol 1000 Unit(s) Oral daily  clopidogrel Tablet 75 milliGRAM(s) Oral daily  dextrose 5%. 1000 milliLiter(s) (50 mL/Hr) IV Continuous <Continuous>  dextrose 5%. 1000 milliLiter(s) (100 mL/Hr) IV Continuous <Continuous>  dextrose 50% Injectable 25 Gram(s) IV Push once  dextrose 50% Injectable 25 Gram(s) IV Push once  dextrose 50% Injectable 12.5 Gram(s) IV Push once  diazepam    Tablet 5 milliGRAM(s) Oral two times a day  famotidine    Tablet 20 milliGRAM(s) Oral daily  glucagon  Injectable 1 milliGRAM(s) IntraMuscular once  heparin   Injectable 5000 Unit(s) SubCutaneous every 8 hours  insulin lispro (ADMELOG) corrective regimen sliding scale   SubCutaneous every 6 hours  QUEtiapine 25 milliGRAM(s) Oral daily  sertraline 100 milliGRAM(s) Oral two times a day    Vital Signs Last 24 Hrs  T(C): 36.4 (10-06-23 @ 08:30), Max: 36.8 (10-06-23 @ 05:32)  T(F): 97.6 (10-06-23 @ 08:30), Max: 98.2 (10-06-23 @ 05:32)  HR: 73 (10-06-23 @ 08:30) (50 - 82)  BP: 162/79 (10-06-23 @ 08:30) (158/78 - 185/89)  RR: 18 (10-06-23 @ 08:30) (18 - 19)  SpO2: 97% (10-06-23 @ 08:30) (92% - 97%)    LABS:                        10.9   8.90  )-----------( 188      ( 05 Oct 2023 11:32 )             34.6     10-05    135  |  95<L>  |  35<H>  ----------------------------<  138<H>  4.3   |  35<H>  |  1.86<H>    Ca    9.5      05 Oct 2023 11:32    TPro  7.2  /  Alb  3.0<L>  /  TBili  0.4  /  DBili  x   /  AST  11  /  ALT  10  /  AlkPhos  59  10-05    LIVER FUNCTIONS - ( 05 Oct 2023 11:32 )  Alb: 3.0 g/dL / Pro: 7.2 g/dL / ALK PHOS: 59 U/L / ALT: 10 U/L / AST: 11 U/L / GGT: x           PT/INR - ( 05 Oct 2023 11:32 )   PT: 10.8 sec;   INR: 0.92 ratio       PTT - ( 05 Oct 2023 11:32 )  PTT:32.2 sec    A/P:    HD today  Full consult to follow    [phone]       NEPHROLOGY CONSULTATION    CHIEF COMPLAINT: PEG mal-fx    HPI:  Pt is 78 yo F PMH HTN, DM type 2 (not on insulin), ESRD on HD MWF, CVA, dementia coming to the ED for clogged feeding tube. Patient had CVA in 2022 that caused difficulty swallowing therefore PEG was placed in Nov 2022. As per patient, PEG gets clogged every few months, most recently was a few days ago for which she came to the ED and after flush it started working. On 10/4 noted redness around PEG site, yesterday redness was worse and unable to administer feeds or medications via PEG therefore patient came to the ED. Providers in the ED unable to unclog tube and could not remove it either. No abd pain, nausea or vomiting, fever or chills. No CP, SOB, due for HD today.    ROS:  as above    Allergies:  sulfa drugs (Rash)    PAST MEDICAL & SURGICAL HISTORY:  ESRD (end stage renal disease) on dialysis  Hypertension  CVA (cerebrovascular accident)  Feeding by G-tube    SOCIAL HISTORY:  negative    FAMILY HISTORY:  FHx: breast cancer (Sibling)    MEDICATIONS  (STANDING):  atorvastatin 10 milliGRAM(s) Oral at bedtime  cefTRIAXone   IVPB      cefTRIAXone   IVPB 1000 milliGRAM(s) IV Intermittent every 24 hours  cholecalciferol 1000 Unit(s) Oral daily  clopidogrel Tablet 75 milliGRAM(s) Oral daily  dextrose 5%. 1000 milliLiter(s) (50 mL/Hr) IV Continuous <Continuous>  dextrose 5%. 1000 milliLiter(s) (100 mL/Hr) IV Continuous <Continuous>  dextrose 50% Injectable 25 Gram(s) IV Push once  dextrose 50% Injectable 25 Gram(s) IV Push once  dextrose 50% Injectable 12.5 Gram(s) IV Push once  diazepam    Tablet 5 milliGRAM(s) Oral two times a day  famotidine    Tablet 20 milliGRAM(s) Oral daily  glucagon  Injectable 1 milliGRAM(s) IntraMuscular once  heparin   Injectable 5000 Unit(s) SubCutaneous every 8 hours  insulin lispro (ADMELOG) corrective regimen sliding scale   SubCutaneous every 6 hours  QUEtiapine 25 milliGRAM(s) Oral daily  sertraline 100 milliGRAM(s) Oral two times a day    Vital Signs Last 24 Hrs  T(C): 36.4 (10-06-23 @ 08:30), Max: 36.8 (10-06-23 @ 05:32)  T(F): 97.6 (10-06-23 @ 08:30), Max: 98.2 (10-06-23 @ 05:32)  HR: 73 (10-06-23 @ 08:30) (50 - 82)  BP: 162/79 (10-06-23 @ 08:30) (158/78 - 185/89)  RR: 18 (10-06-23 @ 08:30) (18 - 19)  SpO2: 97% (10-06-23 @ 08:30) (92% - 97%)    s1s2  b/l air entry  soft, ND  no edema, AVF patent     LABS:                        10.9   8.90  )-----------( 188      ( 05 Oct 2023 11:32 )             34.6     10-05    135  |  95<L>  |  35<H>  ----------------------------<  138<H>  4.3   |  35<H>  |  1.86<H>    Ca    9.5      05 Oct 2023 11:32    TPro  7.2  /  Alb  3.0<L>  /  TBili  0.4  /  DBili  x   /  AST  11  /  ALT  10  /  AlkPhos  59  10-05    LIVER FUNCTIONS - ( 05 Oct 2023 11:32 )  Alb: 3.0 g/dL / Pro: 7.2 g/dL / ALK PHOS: 59 U/L / ALT: 10 U/L / AST: 11 U/L / GGT: x           PT/INR - ( 05 Oct 2023 11:32 )   PT: 10.8 sec;   INR: 0.92 ratio       PTT - ( 05 Oct 2023 11:32 )  PTT:32.2 sec    A/P:    Adm w/clogged feeding tube  S/p Peg tube unclogged at bedside by GI  ESRD on HD MWF  HD today as ordered  1kg fluid removal w/HD  Next HD on Monday as ip or op     [phone]

## 2023-10-06 NOTE — DIETITIAN INITIAL EVALUATION ADULT - PERTINENT LABORATORY DATA
10-05    135  |  95<L>  |  35<H>  ----------------------------<  138<H>  4.3   |  35<H>  |  1.86<H>    Ca    9.5      05 Oct 2023 11:32    TPro  7.2  /  Alb  3.0<L>  /  TBili  0.4  /  DBili  x   /  AST  11  /  ALT  10  /  AlkPhos  59  10-05  POCT Blood Glucose.: 147 mg/dL (10-06-23 @ 08:27)  A1C with Estimated Average Glucose Result: 5.8 % (10-06-23 @ 06:55)

## 2023-10-06 NOTE — PROGRESS NOTE ADULT - SUBJECTIVE AND OBJECTIVE BOX
INTERVAL HPI/OVERNIGHT EVENTS:  Patient seen and examined at bed side, care giver present. Her PEG tube patent, tolerating PEG feed.  Denies abdominal pain, nausea, vomiting diarrhea or constipation       MEDICATIONS  (STANDING):  atorvastatin 10 milliGRAM(s) Oral at bedtime  cefTRIAXone   IVPB 1000 milliGRAM(s) IV Intermittent every 24 hours  cefTRIAXone   IVPB      cholecalciferol 1000 Unit(s) Oral daily  clopidogrel Tablet 75 milliGRAM(s) Oral daily  dextrose 5%. 1000 milliLiter(s) (50 mL/Hr) IV Continuous <Continuous>  dextrose 5%. 1000 milliLiter(s) (100 mL/Hr) IV Continuous <Continuous>  dextrose 50% Injectable 12.5 Gram(s) IV Push once  dextrose 50% Injectable 25 Gram(s) IV Push once  dextrose 50% Injectable 25 Gram(s) IV Push once  diazepam    Tablet 5 milliGRAM(s) Oral two times a day  famotidine    Tablet 20 milliGRAM(s) Oral daily  glucagon  Injectable 1 milliGRAM(s) IntraMuscular once  heparin   Injectable 5000 Unit(s) SubCutaneous every 8 hours  insulin lispro (ADMELOG) corrective regimen sliding scale   SubCutaneous every 6 hours  QUEtiapine 25 milliGRAM(s) Oral daily  sertraline 100 milliGRAM(s) Oral two times a day    MEDICATIONS  (PRN):  amLODIPine   Tablet 2.5 milliGRAM(s) Oral daily PRN > 130  dextrose Oral Gel 15 Gram(s) Oral once PRN Blood Glucose LESS THAN 70 milliGRAM(s)/deciliter      Allergies    sulfa drugs (Rash)    Intolerances        PAST MEDICAL & SURGICAL HISTORY:  ESRD (end stage renal disease) on dialysis      Hypertension      CVA (cerebrovascular accident)      Feeding by G-tube      PHYSICAL EXAM:   Vital Signs:  Vital Signs Last 24 Hrs  T(C): 36.4 (06 Oct 2023 08:30), Max: 36.8 (06 Oct 2023 05:32)  T(F): 97.6 (06 Oct 2023 08:30), Max: 98.2 (06 Oct 2023 05:32)  HR: 73 (06 Oct 2023 08:30) (50 - 82)  BP: 162/79 (06 Oct 2023 08:30) (158/78 - 185/89)  BP(mean): --  RR: 18 (06 Oct 2023 08:30) (18 - 19)  SpO2: 97% (06 Oct 2023 08:30) (92% - 97%)    Parameters below as of 06 Oct 2023 08:30  Patient On (Oxygen Delivery Method): room air      Daily     Daily Weight in k.8 (05 Oct 2023 20:43)I&O's Summary    05 Oct 2023 07:01  -  06 Oct 2023 07:00  --------------------------------------------------------  IN: 240 mL / OUT: 200 mL / NET: 40 mL        GENERAL:  Appears stated age  HEENT:  NC/AT,  conjunctivae clear and pink  CHEST:  Full & symmetric excursion  HEART:  Regular rhythm, S1, S2  ABDOMEN:  Soft, non-tender, non-distended, normoactive bowel sounds, PEG tube   EXTEREMITIES:  no cyanosis,clubbing or edema  SKIN:  No rash/warm/dry  NEURO:  Alert, oriented      LABS:                        10.9   8.90  )-----------( 188      ( 05 Oct 2023 11:32 )             34.6     10-05    135  |  95<L>  |  35<H>  ----------------------------<  138<H>  4.3   |  35<H>  |  1.86<H>    Ca    9.5      05 Oct 2023 11:32    TPro  7.2  /  Alb  3.0<L>  /  TBili  0.4  /  DBili  x   /  AST  11  /  ALT  10  /  AlkPhos  59  10-05    PT/INR - ( 05 Oct 2023 11:32 )   PT: 10.8 sec;   INR: 0.92 ratio         PTT - ( 05 Oct 2023 11:32 )  PTT:32.2 sec  Urinalysis Basic - ( 05 Oct 2023 11:32 )    Color: x / Appearance: x / SG: x / pH: x  Gluc: 138 mg/dL / Ketone: x  / Bili: x / Urobili: x   Blood: x / Protein: x / Nitrite: x   Leuk Esterase: x / RBC: x / WBC x   Sq Epi: x / Non Sq Epi: x / Bacteria: x      amylase   lipase  RADIOLOGY & ADDITIONAL TESTS:

## 2023-10-06 NOTE — DISCHARGE NOTE PROVIDER - CARE PROVIDER_API CALL
Rahel Ochoa  Internal Medicine  [address], [place]   Phone: [phone]  Fax: [phone]  Follow Up Time:

## 2023-10-06 NOTE — DIETITIAN INITIAL EVALUATION ADULT - NSFNSGIIOFT_GEN_A_CORE
10-05-23 @ 07:01  -  10-06-23 @ 07:00  --------------------------------------------------------  OUT:  Total OUT: 0 mL    Total NET: 240 mL

## 2023-10-06 NOTE — DIETITIAN INITIAL EVALUATION ADULT - NS FNS DIET ORDER
Diet, NPO with Tube Feed:   Tube Feeding Modality: Gastrostomy  Nepro with Carb Steady  Total Volume for 24 Hours (mL): 960  Bolus  Total Volume of Bolus (mL):  240  Total # of Feeds: 4  Tube Feed Frequency: Every 6 hours   Tube Feed Start Time: 08:00  Bolus Feed Rate (mL per Hour): 240   Bolus Feed Duration (in Hours): 0.5  Bolus Feed Instructions:  Please provide 240 mL bolus feedings @ 8am, 12pm, 4pm, & 8pm (10-05-23 @ 17:04)

## 2023-10-06 NOTE — PROGRESS NOTE ADULT - PROBLEM SELECTOR PLAN 1
S/P Peg tube unclogged at bedside  Educated the care giver importance of flushing the PEG tube, after feed and medication.   Avoid putting the Gauze between the skin and PEG hub.   Clean PEG site routinely   Check residuals  Keep HOB elevated for feeding.

## 2023-10-06 NOTE — DISCHARGE NOTE PROVIDER - NSDCCPCAREPLAN_GEN_ALL_CORE_FT
PRINCIPAL DISCHARGE DIAGNOSIS  Diagnosis: PEG tube malfunction  Assessment and Plan of Treatment: You were evaluated by GI for PEG tube malfunction. PEG tube now functioning.   Please continue taking Keflex as directed  Follow up with your PCP

## 2023-10-06 NOTE — DISCHARGE NOTE NURSING/CASE MANAGEMENT/SOCIAL WORK - NSDCPEFALRISK_GEN_ALL_CORE
For information on Fall & Injury Prevention, visit: [email or web address] OR  [email or web address] OR  https://www.cdc.gov/steadi/patient.html

## 2023-10-06 NOTE — DISCHARGE NOTE PROVIDER - NSDCMRMEDTOKEN_GEN_ALL_CORE_FT
amLODIPine 2.5 mg oral tablet: 1 tab(s) orally once a day as needed for SBP &gt;120  atorvastatin 10 mg oral tablet: 1 tab(s) orally once a day (at bedtime)  clopidogrel 75 mg oral tablet: 1 tab(s) orally once a day  diazePAM 5 mg oral tablet: 1 tab(s) orally 2 times a day  famotidine 20 mg oral tablet: 1 tab(s) orally once a day  QUEtiapine 25 mg oral tablet: 1 tab(s) orally once a day  sertraline 100 mg oral tablet: 1 tab(s) orally 2 times a day  Vitamin D3 25 mcg (1000 intl units) oral tablet: 1 tab(s) orally once a day   amLODIPine 2.5 mg oral tablet: 1 tab(s) orally once a day as needed for SBP &gt;120  atorvastatin 10 mg oral tablet: 1 tab(s) orally once a day (at bedtime)  cephalexin 250 mg oral tablet: 1 tab(s) orally every 12 hours  clopidogrel 75 mg oral tablet: 1 tab(s) orally once a day  diazePAM 5 mg oral tablet: 1 tab(s) orally 2 times a day  famotidine 20 mg oral tablet: 1 tab(s) orally once a day  QUEtiapine 25 mg oral tablet: 1 tab(s) orally once a day  sertraline 100 mg oral tablet: 1 tab(s) orally 2 times a day  Vitamin D3 25 mcg (1000 intl units) oral tablet: 1 tab(s) orally once a day

## 2023-10-06 NOTE — DIETITIAN INITIAL EVALUATION ADULT - ORAL INTAKE PTA/DIET HISTORY
Patient was receiving Glucerna 1.5 QID via PEG prior to admission. No food allergies/intolerances noted.

## 2023-10-06 NOTE — DIETITIAN INITIAL EVALUATION ADULT - OTHER INFO
Patient tolerating current tube feeding regimen. Nepro QID via PEG provides 1728 kcals, 78g protein, and 698 mL H2O. ESRD on HD. Next HD session noted to be today 10/6/23. T2DM noted. A1C: 5.8 (10/6/23). Skin noted bruised (ecchymotic). Last BM noted on 04-Oct-2023. +2 left foot edema noted.

## 2023-10-06 NOTE — PROGRESS NOTE ADULT - ASSESSMENT
77 year old F PMH HTN, DM type 2 (not on insulin), CKD stage 3 on HD MWF, CVA, dementia coming to the ED for clogged feeding tube. Patient had CVA in 2022 and cause nerve paralysis and difficulty swallowing therefore PEG was placed in Nov 2022    GI consultation for PEG tube malfunction.   Please continue current management, we can sign off. Please reconsult as needed
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
## 2023-10-06 NOTE — DISCHARGE NOTE NURSING/CASE MANAGEMENT/SOCIAL WORK - PATIENT PORTAL LINK FT
You can access the FollowMyHealth Patient Portal offered by [facility] by registering at the following website: [email or web address]. By joining [facility]’s FollowMyHealth portal, you will also be able to view your health information using other applications (apps) compatible with our system.

## 2023-10-08 NOTE — H&P ADULT - PROBLEM SELECTOR PLAN 2
pt is conisdered a intermediate risk candidate for a low to intermediate risk procedure, pt is medically optimized as long as she remains hemodynamically and clinically stable

## 2023-10-08 NOTE — ED PROVIDER NOTE - PHYSICAL EXAMINATION
Gen: Well appearing in NAD.   ENT: oral mucosa moist   Head: atraumatic  Abd: soft, NT/ND, no rebound or guarding. PEG tube ostomy noted, No redness or signs of infection noted  Msk: Left foot in a posterior splint.  +TTP over the left medial and lateral malleolus. No bruising   Neuro: AAO x3, patient moving all extremity equally, no focal neuro deficits noted  Skin: Normal for race.   Psych: Alert and oriented

## 2023-10-08 NOTE — H&P ADULT - PROBLEM SELECTOR PLAN 4
chronic  hha and  reinforced importance of anxiolytics especially at night  iv diazepam ordered with hold paramters  restart sertraline and quetiapine asap once peg is placed and functional

## 2023-10-08 NOTE — H&P ADULT - DECREASED STRENGTH DETAIL
residual weakness from cva, chronic, LLE in hard cast,foot exposed, tender to touch, warm and perfused above and below cast

## 2023-10-08 NOTE — H&P ADULT - CONVERSATION DETAILS
ADVANCED CARE PLANNING NOTE:  I met with patient and the primary care giver / designated health care proxy.  Discussed in details about current diagnosis of advanced renal disease, dementia and PEG placement. treatment options and prognosis.   Also discussed about Cardiac and respiratory resuscitative measures including CPR, vasopressors and   ventilator support via mechanical intubation.   All risks and benefits discussed.   Patient wishes and goals of care were addressed inclusive of: FULL CODE  What matters most to them regarding their current health status, emotional support and reassurance offered.  Medication management: Home medications reviewed and reconciled; current active were reviewed  All questions and concerns were answered and addressed.   About 16  minutes was spent in advanced Care Planning.

## 2023-10-08 NOTE — ED ADULT NURSE NOTE - NSFALLRISKINTERV_ED_ALL_ED

## 2023-10-08 NOTE — H&P ADULT - PROBLEM SELECTOR PLAN 7
heparin for dvt ppx chronic  restart po meds via peg tube once replaced  can try iv med if elevated prn

## 2023-10-08 NOTE — H&P ADULT - PROBLEM SELECTOR PLAN 5
yolanda [facility] HD  apprec renal Dr May for dialysis tomorrow

## 2023-10-08 NOTE — ED PROVIDER NOTE - ATTENDING APP SHARED VISIT CONTRIBUTION OF CARE
Dr. Quentin UNGER: I performed a face to face bedside interview with patient regarding history of present illness, review of symptoms and past medical history. I completed an independent physical exam.  I have discussed patient's plan of care with PA.   I agree with note as stated above, having amended the EMR as needed to reflect my findings.   This includes HISTORY OF PRESENT ILLNESS, HIV, PAST MEDICAL/SURGICAL/FAMILY/SOCIAL HISTORY, ALLERGIES AND HOME MEDICATIONS, REVIEW OF SYSTEMS, PHYSICAL EXAM, and any PROGRESS NOTES during the time I functioned as the attending physician for this patient.

## 2023-10-08 NOTE — ED PROVIDER NOTE - CLINICAL SUMMARY MEDICAL DECISION MAKING FREE TEXT BOX
77 year old F PMH HTN, DM type 2 (not on insulin), CKD stage 3 on HD MWF, CVA, dementia, Presents to the ED for a dislodged PEG tube morning.  Per aide at bedside patient had her feeding last night normally with no issues.  When patient woke up this morning and she went to check on her she noticed that the PEG tube came out.  Patient also incidentally reporting left ankle pain s/p her foot got caught in her motorized wheelchair today third and fourth metatarsal fracture which she has a posterior splint for. No abdominal pain no nausea vomiting or other complaints. PE as noted above. will replace PEG tube and repeat XR of left foot 77 year old F PMH HTN, DM type 2 (not on insulin), CKD stage 3 on HD MWF, CVA, dementia, Presents to the ED for a dislodged 18F PEG tube morning.  Per aide at bedside patient had her feeding last night normally with no issues.  When patient woke up this morning and she went to check on her she noticed that the PEG tube came out.  Patient also incidentally reporting left ankle pain s/p her foot got caught in her motorized wheelchair today third and fourth metatarsal fracture which she has a posterior splint for. No abdominal pain no nausea vomiting or other complaints. PE as noted above. will replace PEG tube and repeat XR of left foot    1142am: unable to pass 18F PEG tube. GI consulted, spoke with GI NP Sona 77 year old F PMH HTN, DM type 2 (not on insulin), CKD stage 3 on HD MWF, CVA, dementia, Presents to the ED for a dislodged 18F PEG tube morning.  Per aide at bedside patient had her feeding last night at 8pm normally with no issues.  When patient woke up this morning at 8am the HHA noticed that the PEG tube came out.  Patient also incidentally reporting left ankle pain s/p her foot got caught in her motorized wheelchair today third and fourth metatarsal fracture which she has a posterior splint for. No abdominal pain no nausea vomiting or other complaints. PE as noted above. will replace PEG tube and repeat XR of left foot    1142am: unable to pass 18F PEG tube. GI consulted, spoke with GI NP Sona    1257pm:  GI NP Sona also unable to pass PEG tube, pt will be admitted to go to the OR tomorrow morning 77 year old F PMH HTN, DM type 2 (not on insulin), CKD stage 3 on HD MWF, CVA, dementia, Presents to the ED for a dislodged 18F PEG tube morning.  Per aide at bedside patient had her feeding last night at 8pm normally with no issues.  When patient woke up this morning at 8am the HHA noticed that the PEG tube came out.  Patient also incidentally reporting left ankle pain s/p her foot got caught in her motorized wheelchair today third and fourth metatarsal fracture which she has a posterior splint for. No abdominal pain no nausea vomiting or other complaints. PE as noted above. will replace PEG tube and repeat XR of left foot    1142am: unable to pass 18F PEG tube. GI consulted, spoke with GI NP Sona    1257pm:  GI NP Sona also unable to pass PEG tube, pt will be admitted to go to the OR tomorrow morning    Quentin UNGER, EM/Toxicology Attendinf PMH HTN, DM type 2 (not on insulin), CKD stage 3 on HD MWF, CVA, dementia, s/p g-tube dependent, presenting with dislodged G-tube today, > 12 hour ago. No other complaints. No abdominal pain, nausea/vomiting, fevers, chills, chest pain, shortness of breath, diarrhea. No cellulitis around site.   Exam is notable for (+) dislodged G-tube, abd soft nd nt (+) BS  DDx includes: dislodged G-tube  PLAN:  - Attempted to replace the G-tube, unable to pass due to stricture.  - GI consulted, unable to place G-tube, recommend admission for endoscopic placement.  - To be admitted.

## 2023-10-08 NOTE — H&P ADULT - PROBLEM SELECTOR PLAN 3
acute, recurrent  cast got caught with motorized wheelchair   f/u LLE xray reads  apprec podiatry consult Dr Batres, may need ortho consult if fracture noted on xray on read  pain control prn

## 2023-10-08 NOTE — ED PROVIDER NOTE - OBJECTIVE STATEMENT
77 year old F PMH HTN, DM type 2 (not on insulin), CKD stage 3 on HD MWF, CVA, dementia, Presents to the ED for a dislodged PEG tube morning.  Per aide at bedside patient had her feeding last night normally with no issues.  When patient woke up this morning and she went to check on her she noticed that the PEG tube came out.  Patient also incidentally reporting left ankle pain s/p her foot got caught in her motorized wheelchair today third and fourth metatarsal fracture which she has a posterior splint for. No abdominal pain no nausea vomiting or other complaints. 77 year old F PMH HTN, DM type 2 (not on insulin), CKD stage 3 on HD MWF, CVA, dementia, Presents to the ED for a dislodged 18F PEG tube morning.  Per aide at bedside patient had her feeding last night normally with no issues.  When patient woke up this morning and she went to check on her she noticed that the PEG tube came out.  Patient also incidentally reporting left ankle pain s/p her foot got caught in her motorized wheelchair today third and fourth metatarsal fracture which she has a posterior splint for. No abdominal pain no nausea vomiting or other complaints. 77 year old F PMH HTN, DM type 2 (not on insulin), CKD stage 3 on HD MWF, CVA, dementia, Presents to the ED for a dislodged 18F PEG tube morning.  Per aide at bedside patient had her feeding last night at 8pm normally with no issues.  When patient woke up this morning at 8am the HHA noticed that the PEG tube came out.  Patient also incidentally reporting left ankle pain s/p her foot got caught in her motorized wheelchair today third and fourth metatarsal fracture which she has a posterior splint for. No abdominal pain no nausea vomiting or other complaints.

## 2023-10-08 NOTE — H&P ADULT - NSICDXPASTSURGICALHX_GEN_ALL_CORE_FT
PAST SURGICAL HISTORY:  Feeding by G-tube     H/O Spinal surgery     History of appendectomy     
yes

## 2023-10-08 NOTE — H&P ADULT - ASSESSMENT
77 year old F PMH HTN, DM type 2 (diet controlled, not on insulin), CKD stage 3 on HD MWF, CVA, dementia with occasional agitation, Presents to the ED for a dislodged 18F PEG tube as well as re-injury with mechanical wheelchair of LLE

## 2023-10-08 NOTE — H&P ADULT - PROBLEM/PLAN-7
Bring lab results to office  Stat Zetia  Begin vitamin E 400 IU and Coq10 300mg bid   DISPLAY PLAN FREE TEXT

## 2023-10-08 NOTE — H&P ADULT - PROBLEM SELECTOR PLAN 1
acute  apprec GI recs  npo including meds as pt gets meds via PEG as per HHA and   once PEG replaced give sertraline ASAP and other po meds  cont selected iv meds   preop eval as below

## 2023-10-08 NOTE — H&P ADULT - NSICDXPASTMEDICALHX_GEN_ALL_CORE_FT
PAST MEDICAL HISTORY:  CVA (cerebrovascular accident)     Dementia     ESRD (end stage renal disease) on dialysis     Hypertension

## 2023-10-08 NOTE — H&P ADULT - HISTORY OF PRESENT ILLNESS
77 year old F PMH HTN, DM type 2 (diet controlled, not on insulin), CKD stage 3 on HD MWF, CVA, dementia with occasional agitation, Presents to the ED for a dislodged 18F PEG tube morning.  Per aide at bedside patient had her feeding last night at 8pm normally with no issues.  When patient woke up this morning at 8am the HHA noticed that the PEG tube came out.  Patient also incidentally reporting left ankle pain s/p her foot got caught in her motorized wheelchair today third and fourth metatarsal fracture which she has a posterior splint for previously. No abdominal pain no nausea vomiting or other complaints. HPI verified with HHA and  at bedside, pt tends to get agitated at night as per aide so usually valium helps. Pt was recently hospitalized and discharged on 10/6 for PEG site cellulitis, was on antibiotics and sent home with 5d keflex to complete on 10/11.     In the ED pt was seen by GI who recommended npo mno for OR tomorrow for re-placement of PEG tube.

## 2023-10-09 NOTE — CHART NOTE - NSCHARTNOTEFT_GEN_A_CORE
Peg tube procedure cancelled due to not being able to get line-- pt due for dialysis today will have nurse attempt line after dialysis.  Peg tube rescheduled for tuesday

## 2023-10-09 NOTE — DIETITIAN INITIAL EVALUATION ADULT - NS FNS DIET ORDER
Diet, NPO (10-08-23 @ 15:21)  Diet, NPO after Midnight:      NPO Start Date: 08-Oct-2023,   NPO Start Time: 23:59 (10-08-23 @ 13:03)

## 2023-10-09 NOTE — DIETITIAN INITIAL EVALUATION ADULT - OTHER INFO
77 year old F PMH HTN, DM type 2 (diet controlled, not on insulin), CKD stage 3 on HD MWF, CVA, dementia with occasional agitation, Presents to the ED for a dislodged 18F PEG tube , patient NPO at present awaiting PEG replacement , LR infusing @ 40ml/hr. Patient receiving HD 3 x week (M,W, F) POCT reviewed , AS per aide patient receiving Glucerna bolus feeds , patient would benefit from receiving Nepro bolus  feeds due to renal status / HD , suggest advance to Nepro 240ml 4x day which will provide 1728kcals, 78gms protein , [id number] with Prosource 30ml x 2 for an additional 120kcals, 30gms protein .  77 year old F PMH HTN, DM type 2 (diet controlled, not on insulin), CKD stage 3 on HD MWF, CVA, dementia with occasional agitation, Presents to the ED for a dislodged 18F PEG tube , patient NPO at present awaiting PEG replacement , LR infusing @ 40ml/hr. Patient receiving HD 3 x week (M,W, F) POCT reviewed , AS per aide patient receiving Glucerna bolus feeds , patient would benefit from receiving Nepro bolus  feeds due to renal status / HD , suggest advance to Nepro 240ml 4x day which will provide 1728kcals, 78gms protein , [id number] with Prosource 30ml x 2 for an additional 120kcals, 30gms protein . Skin noted ecchymotic

## 2023-10-09 NOTE — CONSULT NOTE ADULT - SUBJECTIVE AND OBJECTIVE BOX
77 year old F PMH HTN, DM, CKD stage 3 on HD MWF, CVA, dementia, Presents to the ED for a dislodged PEG tube morning. Patient also reported left ankle pain s/p her foot got caught in her motorized wheelchair today third and fourth metatarsal fracture which she has a posterior splint for previously. No abdominal pain no nausea vomiting or other complaints                              11.4   12.08 )-----------( 177      ( 08 Oct 2023 12:15 )             35.9         
NEPHROLOGY CONSULTATION    CHIEF COMPLAINT:  ESRD      HPI:  Presents with dislodged PEG tube that needs to be replaced.    ROS:  denies cp, sob      PAST MEDICAL & SURGICAL HISTORY:  ESRD (end stage renal disease) on dialysis      Hypertension      CVA (cerebrovascular accident)      Dementia      Feeding by G-tube      H/O Spinal surgery      History of appendectomy            Social History:  , lives in home with HHA, functional deficits from stroke, (08 Oct 2023 13:30)      FAMILY HISTORY:  FHx: breast cancer (Sibling)        Home Medications:  amLODIPine 2.5 mg oral tablet: 1 tab(s) orally once a day as needed for SBP &gt;120 (05 Oct 2023 15:35)  atorvastatin 10 mg oral tablet: 1 tab(s) orally once a day (at bedtime) (05 Oct 2023 15:35)  clopidogrel 75 mg oral tablet: 1 tab(s) orally once a day (05 Oct 2023 15:34)  diazePAM 5 mg oral tablet: 1 tab(s) orally 2 times a day (05 Oct 2023 15:34)  famotidine 20 mg oral tablet: 1 tab(s) orally once a day (05 Oct 2023 15:36)  QUEtiapine 25 mg oral tablet: 1 tab(s) orally once a day (05 Oct 2023 15:36)  sertraline 100 mg oral tablet: 1 tab(s) orally 2 times a day (05 Oct 2023 15:36)  Vitamin D3 25 mcg (1000 intl units) oral tablet: 1 tab(s) orally once a day (05 Oct 2023 15:36)      MEDICATIONS  (STANDING):  cefTRIAXone   IVPB      cefTRIAXone   IVPB 1000 milliGRAM(s) IV Intermittent every 24 hours  diazepam  Injectable 5 milliGRAM(s) IV Push <User Schedule>  famotidine  IVPB 20 milliGRAM(s) IV Intermittent daily  heparin   Injectable 5000 Unit(s) SubCutaneous every 12 hours  lactated ringers. 1000 milliLiter(s) (40 mL/Hr) IV Continuous <Continuous>      PHYSICAL EXAMINATION:    Conversant, no apparent distress  PERRLA, pink conjunctivae, no ptosis  Good dentition, no pharyngeal erythema  Neck non tender, no mass, no thyromegaly or nodules  Normal respiratory effort, lungs clear to auscultation  Heart with RRR, no murmurs or rubs, no peripheral edema  Abdomen soft, no masses, no organomegaly  Skin no rashes, ulcers or lesions, normal turgor and temperature  Appropriate affect, AO x 3  LUE AVF + bruit    LABS:                        10.0   8.31  )-----------( 161      ( 09 Oct 2023 06:12 )             31.0     10-09    136  |  96  |  72<H>  ----------------------------<  117<H>  4.1   |  30  |  2.83<H>    Ca    9.3      09 Oct 2023 06:12    TPro  6.8  /  Alb  2.9<L>  /  TBili  0.4  /  DBili  x   /  AST  9<L>  /  ALT  11  /  AlkPhos  61  10-09      RADIOLOGY:  Chest X-Ray personally reviewed and shows NAPD      ASSESSMENT:  1.  ESRD on HD MWF  2.  Dislodged PEG tube    PLAN:  Dialysis ordered for later today          
INTERVAL HPI/OVERNIGHT EVENTS:  HPI: GI Consult requested for 77 Year old female patient with dislodged PEG tube. Patient was recently discharged from  two days ago after being admitted for clogged PEG tube.  As per patients  and care giver, Patient had last feeding at 8pm last night tolerated it well, it was discovered at 8am this morning by the patients caregiver that the PEG tube was dislodged.  states it must have happened some time after 8pm last night. Upon examination of the PEG tube site, the area appears closed. Denies any abdominal pain, N/V.     MEDICATIONS  (STANDING):  sodium chloride 0.9% Bolus 500 milliLiter(s) IV Bolus once    MEDICATIONS  (PRN):      Allergies    sulfa drugs (Rash)    Intolerances        PAST MEDICAL & SURGICAL HISTORY:  ESRD (end stage renal disease) on dialysis      Hypertension      CVA (cerebrovascular accident)      Feeding by G-tube          REVIEW OF SYSTEMS    Negative unless indicated in HPI	      PHYSICAL EXAM:   Vital Signs:  Vital Signs Last 24 Hrs  T(C): 36.4 (08 Oct 2023 09:58), Max: 36.4 (08 Oct 2023 09:58)  T(F): 97.5 (08 Oct 2023 09:58), Max: 97.5 (08 Oct 2023 09:58)  HR: 100 (08 Oct 2023 09:58) (100 - 100)  BP: 180/89 (08 Oct 2023 09:58) (180/89 - 180/89)  BP(mean): --  RR: 16 (08 Oct 2023 09:58) (16 - 16)  SpO2: 100% (08 Oct 2023 09:58) (100% - 100%)    Parameters below as of 08 Oct 2023 09:58  Patient On (Oxygen Delivery Method): room air      Daily Height in cm: 165.1 (08 Oct 2023 09:58)    Daily I&O's Summary      GENERAL:  Appears stated age, well-groomed, well-nourished, no distress  HEENT:  NC/AT,  conjunctivae clear and pink,   CHEST:  Full & symmetric excursion, no increased effort, breath sounds clear  HEART:  Regular rhythm, S1, S2,   ABDOMEN:  Soft, non-tender, non-distended, normoactive bowel sounds,    EXTEREMITIES:  no cyanosis, or edema  SKIN: warm/dry  NEURO:  Alert, oriented,      LABS:              amylase   lipase  RADIOLOGY & ADDITIONAL TESTS:

## 2023-10-09 NOTE — DIETITIAN INITIAL EVALUATION ADULT - PERTINENT MEDS FT
MEDICATIONS  (STANDING):  cefTRIAXone   IVPB      cefTRIAXone   IVPB 1000 milliGRAM(s) IV Intermittent every 24 hours  diazepam  Injectable 5 milliGRAM(s) IV Push <User Schedule>  famotidine  IVPB 20 milliGRAM(s) IV Intermittent daily  heparin   Injectable 5000 Unit(s) SubCutaneous every 12 hours  lactated ringers. 1000 milliLiter(s) (40 mL/Hr) IV Continuous <Continuous>    MEDICATIONS  (PRN):

## 2023-10-09 NOTE — DIETITIAN INITIAL EVALUATION ADULT - PERTINENT LABORATORY DATA
10-09    136  |  96  |  72<H>  ----------------------------<  117<H>  4.1   |  30  |  2.83<H>    Ca    9.3      09 Oct 2023 06:12    TPro  6.8  /  Alb  2.9<L>  /  TBili  0.4  /  DBili  x   /  AST  9<L>  /  ALT  11  /  AlkPhos  61  10-09  POCT Blood Glucose.: 127 mg/dL (10-08-23 @ 15:16)  A1C with Estimated Average Glucose Result: 5.8 % (10-06-23 @ 06:55)

## 2023-10-09 NOTE — PROGRESS NOTE ADULT - ASSESSMENT
77 year old F PMH HTN, DM type 2 (diet controlled, not on insulin), CKD stage 3 on HD MWF, CVA, dementia with occasional agitation, Presents to the ED for a dislodged 18F PEG tube as well as re-injury with mechanical wheelchair of LLE    ·  Problem: Dislodged gastrostomy tube.   ·  Plan: acute  apprec GI recs  npo including meds as pt gets meds via PEG as per HHA and   once PEG replaced give sertraline ASAP and other po meds  cont selected iv meds   GI eval appreciated- DR Barbosa- on schedule for endo this am   preop eval as below.      ·  Problem: Preop examination.   ·  Plan: pt is conisdered a intermediate risk candidate for a low to intermediate risk procedure, pt is medically optimized as long as she remains hemodynamically and clinically stable.      ·  Problem: Injury of left foot.   ·  Plan: acute, recurrent  cast got caught with motorized wheelchair    LLE xray - reviewed   apprec podiatry consult Dr Batres, recommend walking boot to immobolize left foot fractures - no surgical intervention at this time   pain control prn.      ·  Problem: Moderate Alzheimer's dementia with agitation.   ·  Plan: chronic  hha and  reinforced importance of anxiolytics especially at night  iv diazepam ordered with hold paramters  restart sertraline and quetiapine asap once peg is placed and functional.      ·  Problem: ESRD on dialysis.   ·  Plan: chornicm MWF HD  apprec renal Dr May for dialysis tomorrow.      ·  Problem: DM2 (diabetes mellitus, type 2).   ·  Plan: chronic, last a1c 5.7, stale controlled  diet control no meds needed at t his time.      ·  Problem: HTN (hypertension).  ·  Plan: chronic  restart po meds via peg tube once replaced  can try iv med if elevated prn.      ·  Problem: HLD (hyperlipidemia).   ·  Plan: chronic  cont po med when peg replaced.      ·  Problem: Need for prophylactic measure.   ·  Plan: heparin for dvt ppx.     77 year old F PMH HTN, DM type 2 (diet controlled, not on insulin), CKD stage 3 on HD MWF, CVA, dementia with occasional agitation, Presents to the ED for a dislodged 18F PEG tube as well as re-injury with mechanical wheelchair of LLE    ·  Problem: Dislodged gastrostomy tube.   ·  Plan: acute  apprec GI recs  npo including meds as pt gets meds via PEG as per HHA and   once PEG replaced give sertraline ASAP and other po meds  cont selected iv meds   GI eval appreciated- DR Barbosa- on schedule for endo today around 2pm   preop eval as below.      ·  Problem: Preop examination.   ·  Plan: pt is conisdered a intermediate risk candidate for a low to intermediate risk procedure, pt is medically optimized as long as she remains hemodynamically and clinically stable.      ·  Problem: Injury of left foot.   ·  Plan: acute, recurrent  cast got caught with motorized wheelchair    LLE xray - reviewed   apprec podiatry consult Dr Batres, recommend walking boot to immobolize left foot fractures - no surgical intervention at this time   pain control prn.      ·  Problem: Moderate Alzheimer's dementia with agitation.   ·  Plan: chronic  hha and  reinforced importance of anxiolytics especially at night  iv diazepam ordered with hold paramters  restart sertraline and quetiapine asap once peg is placed and functional.      ·  Problem: ESRD on dialysis.   ·  Plan: chronic -   MWF HD   renal Dr May for dialysis today      ·  Problem: DM2 (diabetes mellitus, type 2).   ·  Plan: chronic, last a1c 5.7, stale controlled  diet control no meds needed at this time.  POCT Blood Glucose.: 127 mg/dL (08 Oct 2023 15:16)      ·  Problem: HTN (hypertension).  ·  Plan: chronic  restart po meds via peg tube once replaced        ·  Problem: HLD (hyperlipidemia).   ·  Plan: chronic  cont po med when peg replaced.      ·  Problem: Need for prophylactic measure.   ·  Plan: heparin for dvt ppx.    10/9 updated  Alvin on plan of care

## 2023-10-09 NOTE — PROGRESS NOTE ADULT - ASSESSMENT
GI Consult requested for 77 Year old female patient with dislodged PEG tube. Patient was recently discharged from  two days ago after being admitted for clogged PEG tube.  As per patients  and care giver, Patient had last feeding at 8pm on 10/07/23 tolerated it well, it was discovered at 8am on 10/08/23 by the patients /caregiver/that the PEG tube was dislodged.  states it must have happened some time after 8pm on 10/07/23.   Patient seen and examined in Endo suite in no acute distress. Denies any abdominal pain, N/V. Patients scheduled for PEG replacement today with Dr. Barbosa.

## 2023-10-09 NOTE — PROGRESS NOTE ADULT - SUBJECTIVE AND OBJECTIVE BOX
INTERVAL HPI/OVERNIGHT EVENTS:  HPI: GI Consult requested for 77 Year old female patient with dislodged PEG tube. Patient was recently discharged from  two days ago after being admitted for clogged PEG tube.  As per patients  and care giver, Patient had last feeding at 8pm on 10/07/23 tolerated it well, it was discovered at 8am on 10/08/23 by the patients /caregiver/that the PEG tube was dislodged.  states it must have happened some time after 8pm on 10/07/23.    Denies any abdominal pain, N/V. Patients scheduled for PEG replacement today with Dr. Barbosa.       MEDICATIONS  (STANDING):  sodium chloride 0.9% Bolus 500 milliLiter(s) IV Bolus once    MEDICATIONS  (PRN):      Allergies    sulfa drugs (Rash)    Intolerances        PAST MEDICAL & SURGICAL HISTORY:  ESRD (end stage renal disease) on dialysis      Hypertension      CVA (cerebrovascular accident)      Feeding by G-tube          REVIEW OF SYSTEMS    Negative unless indicated in HPI	      PHYSICAL EXAM:   Vital Signs Last 24 Hrs  T(C): 36.8 (09 Oct 2023 11:50), Max: 36.8 (09 Oct 2023 11:50)  T(F): 98.3 (09 Oct 2023 11:50), Max: 98.3 (09 Oct 2023 11:50)  HR: 82 (09 Oct 2023 11:50) (71 - 82)  BP: 157/67 (09 Oct 2023 11:50) (108/79 - 157/67)  BP(mean): --  RR: 15 (09 Oct 2023 11:50) (15 - 16)  SpO2: 95% (09 Oct 2023 11:50) (92% - 99%)    Parameters below as of 09 Oct 2023 11:50  Patient On (Oxygen Delivery Method): room air        Daily Height in cm: 165.1 (08 Oct 2023 09:58)    Daily I&O's Summary      GENERAL:  Appears stated age, well-groomed, well-nourished, no distress  HEENT:  NC/AT,  conjunctivae clear and pink,   CHEST:  Full & symmetric excursion, no increased effort, breath sounds clear  HEART:  Regular rhythm, S1, S2,   ABDOMEN:  Soft, non-tender, non-distended, normoactive bowel sounds,    EXTEREMITIES:  no cyanosis, or edema  SKIN: warm/dry  NEURO:  Alert, oriented,      LABS:                        10.0   8.31  )-----------( 161      ( 09 Oct 2023 06:12 )             31.0       10-09    136  |  96  |  72<H>  ----------------------------<  117<H>  4.1   |  30  |  2.83<H>    Ca    9.3      09 Oct 2023 06:12    TPro  6.8  /  Alb  2.9<L>  /  TBili  0.4  /  DBili  x   /  AST  9<L>  /  ALT  11  /  AlkPhos  61  10-09              amylase   lipase  RADIOLOGY & ADDITIONAL TESTS:

## 2023-10-09 NOTE — PROGRESS NOTE ADULT - SUBJECTIVE AND OBJECTIVE BOX
Patient is a 77y old  Female who presents with a chief complaint of PEG re-placement (08 Oct 2023 22:33)      Patient seen and examined at bedside.    ALLERGIES:  sulfa drugs (Rash)    MEDICATIONS  (STANDING):  cefTRIAXone   IVPB      cefTRIAXone   IVPB 1000 milliGRAM(s) IV Intermittent every 24 hours  chlorhexidine 4% Liquid 1 Application(s) Topical Once  diazepam  Injectable 5 milliGRAM(s) IV Push <User Schedule>  famotidine  IVPB 20 milliGRAM(s) IV Intermittent daily  heparin   Injectable 5000 Unit(s) SubCutaneous every 12 hours  lactated ringers. 1000 milliLiter(s) (40 mL/Hr) IV Continuous <Continuous>    MEDICATIONS  (PRN):    Vital Signs Last 24 Hrs  T(F): 97.8 (09 Oct 2023 05:54), Max: 97.9 (08 Oct 2023 20:47)  HR: 79 (09 Oct 2023 05:54) (71 - 100)  BP: 136/58 (09 Oct 2023 05:54) (108/79 - 180/89)  RR: 16 (09 Oct 2023 05:54) (15 - 16)  SpO2: 97% (09 Oct 2023 05:54) (92% - 100%)  I&O's Summary    08 Oct 2023 07:01  -  09 Oct 2023 06:51  --------------------------------------------------------  IN: 0 mL / OUT: 400 mL / NET: -400 mL      PHYSICAL EXAM:  General: NAD, A/O x 3  ENT: MMM  Neck: Supple, No JVD  Lungs: Clear to auscultation bilaterally, Non labored breathing   Cardio: RRR, S1/S2, No murmurs  Abdomen: Soft, Nontender, Nondistended; Bowel sounds present  Extremities: No calf tenderness, No pitting edema    LABS:                        11.4   12.08 )-----------( 177      ( 08 Oct 2023 12:15 )             35.9     10-08    135  |  95  |  67  ----------------------------<  135  4.2   |  31  |  2.40    Ca    10.0      08 Oct 2023 12:15    TPro  7.4  /  Alb  3.3  /  TBili  0.4  /  DBili  x   /  AST  17  /  ALT  13  /  AlkPhos  66  10-08      PT/INR - ( 08 Oct 2023 12:15 )   PT: 10.2 sec;   INR: 0.89 ratio         PTT - ( 08 Oct 2023 12:15 )  PTT:33.4 sec                      POCT Blood Glucose.: 127 mg/dL (08 Oct 2023 15:16)      Urinalysis Basic - ( 08 Oct 2023 12:15 )    Color: x / Appearance: x / SG: x / pH: x  Gluc: 135 mg/dL / Ketone: x  / Bili: x / Urobili: x   Blood: x / Protein: x / Nitrite: x   Leuk Esterase: x / RBC: x / WBC x   Sq Epi: x / Non Sq Epi: x / Bacteria: x        Culture - Blood (collected 05 Oct 2023 11:32)  Source: .Blood Blood-Peripheral  Preliminary Report (08 Oct 2023 15:01):    No growth at 72 Hours    Culture - Blood (collected 05 Oct 2023 11:10)  Source: .Blood Blood-Peripheral  Preliminary Report (08 Oct 2023 15:01):    No growth at 72 Hours        RADIOLOGY & ADDITIONAL TESTS:    Care Discussed with Consultants/Other Providers:    Patient is a 77y old  Female who presents with a chief complaint of PEG re-placement (08 Oct 2023 22:33)      Patient seen and examined at bedside. Pt with complaint of being tired and having some left foot pain     ALLERGIES:  sulfa drugs (Rash)    MEDICATIONS  (STANDING):  cefTRIAXone   IVPB      cefTRIAXone   IVPB 1000 milliGRAM(s) IV Intermittent every 24 hours  chlorhexidine 4% Liquid 1 Application(s) Topical Once  diazepam  Injectable 5 milliGRAM(s) IV Push <User Schedule>  famotidine  IVPB 20 milliGRAM(s) IV Intermittent daily  heparin   Injectable 5000 Unit(s) SubCutaneous every 12 hours  lactated ringers. 1000 milliLiter(s) (40 mL/Hr) IV Continuous <Continuous>    MEDICATIONS  (PRN):    Vital Signs Last 24 Hrs  T(F): 97.8 (09 Oct 2023 05:54), Max: 97.9 (08 Oct 2023 20:47)  HR: 79 (09 Oct 2023 05:54) (71 - 100)  BP: 136/58 (09 Oct 2023 05:54) (108/79 - 180/89)  RR: 16 (09 Oct 2023 05:54) (15 - 16)  SpO2: 97% (09 Oct 2023 05:54) (92% - 100%)  I&O's Summary    08 Oct 2023 07:01  -  09 Oct 2023 06:51  --------------------------------------------------------  IN: 0 mL / OUT: 400 mL / NET: -400 mL      PHYSICAL EXAM:  General: 78 y/o female in NAD, A/O x 3  ENT: MMM  Neck: Supple, No JVD  Lungs: Clear to auscultation bilaterally, Non labored breathing   Cardio: RRR, S1/S2, No murmurs  Abdomen: Soft, Nontender, Nondistended; Bowel sounds present  Extremities: No calf tenderness, No pitting edema, left foot in soft cast     LABS:                        11.4   12.08 )-----------( 177      ( 08 Oct 2023 12:15 )             35.9     10-08    135  |  95  |  67  ----------------------------<  135  4.2   |  31  |  2.40    Ca    10.0      08 Oct 2023 12:15    TPro  7.4  /  Alb  3.3  /  TBili  0.4  /  DBili  x   /  AST  17  /  ALT  13  /  AlkPhos  66  10-08      PT/INR - ( 08 Oct 2023 12:15 )   PT: 10.2 sec;   INR: 0.89 ratio         PTT - ( 08 Oct 2023 12:15 )  PTT:33.4 sec                      POCT Blood Glucose.: 127 mg/dL (08 Oct 2023 15:16)      Urinalysis Basic - ( 08 Oct 2023 12:15 )    Color: x / Appearance: x / SG: x / pH: x  Gluc: 135 mg/dL / Ketone: x  / Bili: x / Urobili: x   Blood: x / Protein: x / Nitrite: x   Leuk Esterase: x / RBC: x / WBC x   Sq Epi: x / Non Sq Epi: x / Bacteria: x        Culture - Blood (collected 05 Oct 2023 11:32)  Source: .Blood Blood-Peripheral  Preliminary Report (08 Oct 2023 15:01):    No growth at 72 Hours    Culture - Blood (collected 05 Oct 2023 11:10)  Source: .Blood Blood-Peripheral  Preliminary Report (08 Oct 2023 15:01):    No growth at 72 Hours        RADIOLOGY & ADDITIONAL TESTS:    Care Discussed with Consultants/Other Providers:

## 2023-10-10 NOTE — PROGRESS NOTE ADULT - ASSESSMENT
77 year old F PMH HTN, DM type 2 (diet controlled, not on insulin), CKD stage 3 on HD MWF, CVA, dementia with occasional agitation, Presents to the ED for a dislodged 18F PEG tube as well as re-injury with mechanical wheelchair of LLE.    Dislodged Gastrostomy Tube  GI appreciated, pt now with access - recently discharged from [facility] after being admitted with clogged PEG tube  NPO for endoscopic replacement of PEG today    Left Metatarsal Fracture  Injury of Left Foot  cast got caught with motorized wheelchair  Xrays reviewed, appreciated podiatry consult with Dr. Batres - recommend walking boot to immobilize left foot fractures, no surgical intervention  pain management     Moderate Alzheimer's Dementia with Agitation  chronic condition  HHA and  reinforced importance of anxiolytics especially at night  Continue diazepam with hold parameters  restart sertraline and seroquel asap once peg is placed and functional    ESRD on HD  renal following, continue HD    DM2  chronic condition, last A1c 5.7, diet controlled  no needs for medications or accuchecks    HTN  HLD  chronic conditions  restart meds once PEG tube replaced    Prophylactic Measure  heparin    updated  arnulfo on plan of care

## 2023-10-10 NOTE — PROGRESS NOTE ADULT - SUBJECTIVE AND OBJECTIVE BOX
No distress    Vital Signs Last 24 Hrs  T(C): 36.8 (10-10-23 @ 13:22), Max: 37.4 (10-10-23 @ 05:26)  T(F): 98.2 (10-10-23 @ 13:22), Max: 99.3 (10-10-23 @ 05:26)  HR: 73 (10-10-23 @ 13:22) (72 - 73)  BP: 161/66 (10-10-23 @ 13:22) (118/65 - 166/61)  RR: 21 (10-10-23 @ 13:22) (19 - 21)  SpO2: 95% (10-10-23 @ 13:22) (94% - 100%)    I&O's Detail    09 Oct 2023 07:01  -  10 Oct 2023 07:00  --------------------------------------------------------  OUT: Other (mL): 1000 mL    Voided (mL): 100 mL  Total OUT: 1100 mL    s1s2  b/l air entry  soft, ND  no edema, AVF patent                         9.3    6.54  )-----------( 151      ( 10 Oct 2023 06:32 )             29.9     10 Oct 2023 06:32    139    |  100    |  43     ----------------------------<  109    3.8     |  30     |  2.20     Ca    9.1        10 Oct 2023 06:32    TPro  6.8    /  Alb  2.9    /  TBili  0.4    /  DBili  x      /  AST  9      /  ALT  11     /  AlkPhos  61     09 Oct 2023 06:12    LIVER FUNCTIONS - ( 09 Oct 2023 06:12 )  Alb: 2.9 g/dL / Pro: 6.8 g/dL / ALK PHOS: 61 U/L / ALT: 11 U/L / AST: 9 U/L / GGT: x           dextrose 5% + sodium chloride 0.45%. 1000 milliLiter(s) IV Continuous <Continuous>  diazepam  Injectable 5 milliGRAM(s) IV Push <User Schedule>  famotidine  IVPB 20 milliGRAM(s) IV Intermittent daily  heparin   Injectable 5000 Unit(s) SubCutaneous every 12 hours    A/P:    Adm w/dislogged feeding tube  New Peg tube per GI  ESRD on HD MWF  HD tomorrow as ordered  1kg fluid removal w/HD as tolerates   Gentle IVF while NPO    [phone]

## 2023-10-10 NOTE — PROGRESS NOTE ADULT - SUBJECTIVE AND OBJECTIVE BOX
INTERVAL HPI/OVERNIGHT EVENTS:  Patient seen and examined st bed side no acute distress, family present . Denies any abdominal pain, N/V. Patients scheduled for PEG replacement today with Dr. Herrera & Dr Wang      She had HD yesterday .       MEDICATIONS  (STANDING):  diazepam  Injectable 5 milliGRAM(s) IV Push <User Schedule>  famotidine  IVPB 20 milliGRAM(s) IV Intermittent daily  heparin   Injectable 5000 Unit(s) SubCutaneous every 12 hours  lactated ringers. 1000 milliLiter(s) (40 mL/Hr) IV Continuous <Continuous>    MEDICATIONS  (PRN):      Allergies    sulfa drugs (Rash)    Intolerances        PAST MEDICAL & SURGICAL HISTORY:  ESRD (end stage renal disease) on dialysis      Hypertension      CVA (cerebrovascular accident)      Dementia      Feeding by G-tube      H/O Spinal surgery      History of appendectomy      PHYSICAL EXAM:   Vital Signs:  Vital Signs Last 24 Hrs  T(C): 36.8 (10 Oct 2023 13:22), Max: 37.4 (10 Oct 2023 05:26)  T(F): 98.2 (10 Oct 2023 13:22), Max: 99.3 (10 Oct 2023 05:26)  HR: 73 (10 Oct 2023 13:22) (72 - 80)  BP: 161/66 (10 Oct 2023 13:22) (118/65 - 166/61)  BP(mean): --  RR: 21 (10 Oct 2023 13:22) (17 - 21)  SpO2: 95% (10 Oct 2023 13:22) (94% - 100%)    Parameters below as of 10 Oct 2023 13:22  Patient On (Oxygen Delivery Method): room air      Daily Height in cm: 162.56 (10 Oct 2023 13:22)    Daily Weight in k.6 (10 Oct 2023 05:26)I&O's Summary    09 Oct 2023 07:01  -  10 Oct 2023 07:00  --------------------------------------------------------  IN: 0 mL / OUT: 1100 mL / NET: -1100 mL        GENERAL:  Appears stated age  HEENT:  NC/AT,  conjunctivae clear and pink  CHEST:  Full & symmetric excursion  HEART:  Regular rhythm, S1, S2  ABDOMEN:  Soft, non-tender, non-distended, normoactive bowel sounds, old PEG site closed   EXTREMITIES :  no cyanosis, clubbing or edema  SKIN:  No rash/warm/dry  NEURO:  Alert      LABS:                        9.3    6.54  )-----------( 151      ( 10 Oct 2023 06:32 )             29.9     10-10    139  |  100  |  43<H>  ----------------------------<  109<H>  3.8   |  30  |  2.20<H>    Ca    9.1      10 Oct 2023 06:32    TPro  6.8  /  Alb  2.9<L>  /  TBili  0.4  /  DBili  x   /  AST  9<L>  /  ALT  11  /  AlkPhos  61  10-09      Urinalysis Basic - ( 10 Oct 2023 06:32 )    Color: x / Appearance: x / SG: x / pH: x  Gluc: 109 mg/dL / Ketone: x  / Bili: x / Urobili: x   Blood: x / Protein: x / Nitrite: x   Leuk Esterase: x / RBC: x / WBC x   Sq Epi: x / Non Sq Epi: x / Bacteria: x      amylase   lipase  RADIOLOGY & ADDITIONAL TESTS:

## 2023-10-10 NOTE — PROGRESS NOTE ADULT - ASSESSMENT
GI Consult requested for 77 Year old female patient with dislodged PEG tube. Patient was recently discharged from  two days ago after being admitted for clogged PEG tube.  As per patients  and care giver, Patient had last feeding at 8pm on 10/07/23 tolerated it well, it was discovered at 8am on 10/08/23 by the patients /caregiver/that the PEG tube was dislodged.  states it must have happened some time after 8pm on 10/07/23.

## 2023-10-10 NOTE — PROGRESS NOTE ADULT - SUBJECTIVE AND OBJECTIVE BOX
Patient is a 77y old  Female who presents with a chief complaint of PEG re-placement (09 Oct 2023 14:25)    Patient seen and examined at bedside.  no acute overnight events    ALLERGIES:  sulfa drugs (Rash)        Vital Signs Last 24 Hrs  T(F): 99.3 (10 Oct 2023 05:26), Max: 99.3 (10 Oct 2023 05:26)  HR: 72 (10 Oct 2023 05:26) (72 - 82)  BP: 118/65 (10 Oct 2023 05:26) (118/65 - 161/84)  RR: 19 (10 Oct 2023 05:26) (15 - 19)  SpO2: 94% (10 Oct 2023 05:26) (94% - 100%)  I&O's Summary    09 Oct 2023 07:01  -  10 Oct 2023 07:00  --------------------------------------------------------  IN: 0 mL / OUT: 1100 mL / NET: -1100 mL      MEDICATIONS:  diazepam  Injectable 5 milliGRAM(s) IV Push <User Schedule>  famotidine  IVPB 20 milliGRAM(s) IV Intermittent daily  heparin   Injectable 5000 Unit(s) SubCutaneous every 12 hours  lactated ringers. 1000 milliLiter(s) IV Continuous <Continuous>      PHYSICAL EXAM:  General: NAD, A/O x 3  ENT: MMM, no thrush  Neck: Supple, No JVD  Lungs: Clear to auscultation bilaterally, non labored breathing, bilateral air entry  Cardio: RRR, S1/S2, No murmurs  Abdomen: Soft, Nontender, Nondistended; Bowel sounds present  Extremities: No cyanosis, No edema, LT foot in soft cast    LABS:                        9.3    6.54  )-----------( 151      ( 10 Oct 2023 06:32 )             29.9     10-10    139  |  100  |  43  ----------------------------<  109  3.8   |  30  |  2.20    Ca    9.1      10 Oct 2023 06:32    TPro  6.8  /  Alb  2.9  /  TBili  0.4  /  DBili  x   /  AST  9   /  ALT  11  /  AlkPhos  61  10-09      PT/INR - ( 08 Oct 2023 12:15 )   PT: 10.2 sec;   INR: 0.89 ratio         PTT - ( 08 Oct 2023 12:15 )  PTT:33.4 sec                      POCT Blood Glucose.: 113 mg/dL (09 Oct 2023 14:41)      Urinalysis Basic - ( 10 Oct 2023 06:32 )    Color: x / Appearance: x / SG: x / pH: x  Gluc: 109 mg/dL / Ketone: x  / Bili: x / Urobili: x   Blood: x / Protein: x / Nitrite: x   Leuk Esterase: x / RBC: x / WBC x   Sq Epi: x / Non Sq Epi: x / Bacteria: x        Culture - Blood (collected 05 Oct 2023 11:32)  Source: .Blood Blood-Peripheral  Preliminary Report (09 Oct 2023 15:01):    No growth at 4 days    Culture - Blood (collected 05 Oct 2023 11:10)  Source: .Blood Blood-Peripheral  Preliminary Report (09 Oct 2023 15:01):    No growth at 4 days          RADIOLOGY & ADDITIONAL TESTS:    Care Discussed with Consultants/Other Providers:    Patient is a 77y old  Female who presents with a chief complaint of PEG re-placement (09 Oct 2023 14:25)    Patient seen and examined at bedside.  no acute overnight events    ALLERGIES:  sulfa drugs (Rash)    Vital Signs Last 24 Hrs  T(F): 99.3 (10 Oct 2023 05:26), Max: 99.3 (10 Oct 2023 05:26)  HR: 72 (10 Oct 2023 05:26) (72 - 82)  BP: 118/65 (10 Oct 2023 05:26) (118/65 - 161/84)  RR: 19 (10 Oct 2023 05:26) (15 - 19)  SpO2: 94% (10 Oct 2023 05:26) (94% - 100%)  I&O's Summary    09 Oct 2023 07:01  -  10 Oct 2023 07:00  --------------------------------------------------------  IN: 0 mL / OUT: 1100 mL / NET: -1100 mL    MEDICATIONS:  diazepam  Injectable 5 milliGRAM(s) IV Push <User Schedule>  famotidine  IVPB 20 milliGRAM(s) IV Intermittent daily  heparin   Injectable 5000 Unit(s) SubCutaneous every 12 hours  lactated ringers. 1000 milliLiter(s) IV Continuous <Continuous>    PHYSICAL EXAM:  General: NAD, A/O x 3  ENT: MMM, no thrush  Neck: Supple, No JVD  Lungs: Clear to auscultation bilaterally, non labored breathing, bilateral air entry  Cardio: RRR, S1/S2, No murmurs  Abdomen: Soft, Nontender, Nondistended; Bowel sounds present  Extremities: No cyanosis, No edema, LT foot in soft cast    LABS:                        9.3    6.54  )-----------( 151      ( 10 Oct 2023 06:32 )             29.9     10-10    139  |  100  |  43  ----------------------------<  109  3.8   |  30  |  2.20    Ca    9.1      10 Oct 2023 06:32    TPro  6.8  /  Alb  2.9  /  TBili  0.4  /  DBili  x   /  AST  9   /  ALT  11  /  AlkPhos  61  10-09      PT/INR - ( 08 Oct 2023 12:15 )   PT: 10.2 sec;   INR: 0.89 ratio         PTT - ( 08 Oct 2023 12:15 )  PTT:33.4 sec                      POCT Blood Glucose.: 113 mg/dL (09 Oct 2023 14:41)      Urinalysis Basic - ( 10 Oct 2023 06:32 )    Color: x / Appearance: x / SG: x / pH: x  Gluc: 109 mg/dL / Ketone: x  / Bili: x / Urobili: x   Blood: x / Protein: x / Nitrite: x   Leuk Esterase: x / RBC: x / WBC x   Sq Epi: x / Non Sq Epi: x / Bacteria: x        Culture - Blood (collected 05 Oct 2023 11:32)  Source: .Blood Blood-Peripheral  Preliminary Report (09 Oct 2023 15:01):    No growth at 4 days    Culture - Blood (collected 05 Oct 2023 11:10)  Source: .Blood Blood-Peripheral  Preliminary Report (09 Oct 2023 15:01):    No growth at 4 days          RADIOLOGY & ADDITIONAL TESTS:    Care Discussed with Consultants/Other Providers:

## 2023-10-11 NOTE — PROGRESS NOTE ADULT - SUBJECTIVE AND OBJECTIVE BOX
No distress    Vital Signs Last 24 Hrs  T(C): 36.5 (10-11-23 @ 13:47), Max: 37 (10-11-23 @ 05:00)  T(F): 97.7 (10-11-23 @ 13:47), Max: 98.6 (10-11-23 @ 05:00)  HR: 84 (10-11-23 @ 13:47) (65 - 84)  BP: 160/73 (10-11-23 @ 13:47) (129/80 - 160/73)  RR: 16 (10-11-23 @ 13:47) (16 - 21)  SpO2: 95% (10-11-23 @ 13:47) (90% - 96%)    I&O's Detail    10 Oct 2023 07:01  -  11 Oct 2023 07:00  --------------------------------------------------------  IN:    dextrose 5% + sodium chloride 0.45%: 150 mL    dextrose 5% + sodium chloride 0.45%: 500 mL  Total IN: 650 mL    OUT:    Voided (mL): 150 mL  Total OUT: 150 mL    s1s2  b/l air entry  soft, ND  no edema, AVF patent                                 9.7    7.36  )-----------( 156      ( 11 Oct 2023 06:25 )             30.1     11 Oct 2023 06:25    140    |  101    |  55     ----------------------------<  132    3.9     |  28     |  2.76     Ca    8.9        11 Oct 2023 06:25    amLODIPine   Tablet 2.5 milliGRAM(s) Oral daily  atorvastatin 10 milliGRAM(s) Oral at bedtime  clopidogrel Tablet 75 milliGRAM(s) Oral daily  diazepam    Tablet 5 milliGRAM(s) Oral two times a day  epoetin valeriy (PROCRIT) Injectable 03447 Unit(s) IV Push <User Schedule>  heparin   Injectable 5000 Unit(s) SubCutaneous every 12 hours  lidocaine/prilocaine Cream 1 Application(s) Topical <User Schedule>  QUEtiapine 25 milliGRAM(s) Oral daily  sertraline 100 milliGRAM(s) Oral two times a day    A/P:    Adm w/dislodged feeding tube  New Peg tube per GI  ESRD on HD MWF  HD today as ordered  2kg fluid removal w/HD as tolerates   Next HD on Friday as ip or op    [phone]

## 2023-10-11 NOTE — PROGRESS NOTE ADULT - SUBJECTIVE AND OBJECTIVE BOX
INTERVAL HPI/OVERNIGHT EVENTS:  Patient seen and examined at bedside with family present. Denies abdominal pain, denies N/V/D, no hematemesis and hematochezia. Patient sp day 1 PEG placement done 10/10 by Dr Herrera with & Dr Wang. Patient NPO at this time. No BM.   To start tube feeds today      MEDICATIONS  (STANDING):  amLODIPine   Tablet 2.5 milliGRAM(s) Oral daily  atorvastatin 10 milliGRAM(s) Oral at bedtime  clopidogrel Tablet 75 milliGRAM(s) Oral daily  diazepam    Tablet 5 milliGRAM(s) Oral two times a day  epoetin valeriy (EPOGEN) Injectable 16956 Unit(s) IV Push <User Schedule>  heparin   Injectable 5000 Unit(s) SubCutaneous every 12 hours  lidocaine/prilocaine Cream 1 Application(s) Topical <User Schedule>  QUEtiapine 25 milliGRAM(s) Oral daily  sertraline 100 milliGRAM(s) Oral two times a day    MEDICATIONS  (PRN):  Allergies    sulfa drugs (Rash)    Intolerances      Vital Signs Last 24 Hrs  T(C): 37 (11 Oct 2023 05:00), Max: 37 (11 Oct 2023 05:00)  T(F): 98.6 (11 Oct 2023 05:00), Max: 98.6 (11 Oct 2023 05:00)  HR: 65 (11 Oct 2023 05:00) (65 - 83)  BP: 132/77 (11 Oct 2023 05:00) (132/77 - 166/61)  BP(mean): --  RR: 16 (11 Oct 2023 05:00) (16 - 21)  SpO2: 93% (11 Oct 2023 05:00) (90% - 100%)    Parameters below as of 11 Oct 2023 05:00  Patient On (Oxygen Delivery Method): room air        PHYSICAL EXAM:    Constitutional: NAD, well-developed  ENTT: clear and pink conjunctivae  Respiratory: No SOB, no distress  Cardiovascular: S1 and S2  Gastrointestinal: +Bowel Sounds all quadrants, soft, Non tender, non distended, PEG rotating freely and without resistance  Extremities: No peripheral edema, neg clubbing, cyanosis  Neurological: A/O x 3  Skin: warm and dry      LABS:                        9.7    7.36  )-----------( 156      ( 11 Oct 2023 06:25 )             30.1     10-11    140  |  101  |  55<H>  ----------------------------<  132<H>  3.9   |  28  |  2.76<H>    Ca    8.9      11 Oct 2023 06:25        Urinalysis Basic - ( 11 Oct 2023 06:25 )    Color: x / Appearance: x / SG: x / pH: x  Gluc: 132 mg/dL / Ketone: x  / Bili: x / Urobili: x   Blood: x / Protein: x / Nitrite: x   Leuk Esterase: x / RBC: x / WBC x   Sq Epi: x / Non Sq Epi: x / Bacteria: x      LIVER FUNCTIONS - ( 09 Oct 2023 06:12 )  Alb: 2.9 g/dL / Pro: 6.8 g/dL / ALK PHOS: 61 U/L / ALT: 11 U/L / AST: 9 U/L / GGT: x

## 2023-10-11 NOTE — DISCHARGE NOTE PROVIDER - CARE PROVIDER_API CALL
Rahel Ochoa  Internal Medicine  [address], [place]   Phone: [phone]  Fax: [phone]  Follow Up Time:

## 2023-10-11 NOTE — DISCHARGE NOTE PROVIDER - INSTRUCTIONS
bolus tube feeds as above bolus tube feeds as above  Nepro with carb steady; total volume for 24hrs 960; 240ml every 6hrs at 8AM, 12pm, 4PM, 8pm

## 2023-10-11 NOTE — DISCHARGE NOTE PROVIDER - NSDCCPCAREPLAN_GEN_ALL_CORE_FT
PRINCIPAL DISCHARGE DIAGNOSIS  Diagnosis: Dislodged gastrostomy tube  Assessment and Plan of Treatment: PEG replaced by GI

## 2023-10-11 NOTE — PROGRESS NOTE ADULT - PROBLEM/PLAN-1
DISPLAY PLAN FREE TEXT
I have reviewed and confirmed nurses' notes for patient's medications, allergies, medical history, and surgical history.

## 2023-10-11 NOTE — DISCHARGE NOTE PROVIDER - HOSPITAL COURSE
Hospital Course  77 year old F PMH HTN, DM type 2 (diet controlled, not on insulin), CKD stage 3 on HD MWF, CVA, dementia with occasional agitation, Presents to the ED for a dislodged 18F PEG tube morning.  Per aide at bedside patient had her feeding last night at 8pm normally with no issues.  When patient woke up this morning at 8am the HHA noticed that the PEG tube came out.  Patient also incidentally reporting left ankle pain s/p her foot got caught in her motorized wheelchair today third and fourth metatarsal fracture which she has a posterior splint for previously. No abdominal pain no nausea vomiting or other complaints. HPI verified with HHA and  at bedside, pt tends to get agitated at night as per aide so usually valium helps. Pt was recently hospitalized and discharged on 10/6 for PEG site cellulitis, was on antibiotics and sent home with 5d keflex to complete on 10/11.     In the ED pt was seen by GI who recommended npo mno for OR tomorrow for re-placement of PEG tube.    Pt admitted on 10/8 with dislodged PEG tube.  Underwent successful endoscopic replacement with GI, also found with left metatarsal fx in the setting of motorized wheelchair incident.  Xrays reviewed, podiatry saw the patient, no acute surgical intervention warranted.  Recommend walking boot to immobilize left foot fracture.  Patient tolerating tube feeds, restarted all her medications.  Pt underwent scheduled HD as per renal.  Medically cleared for discharge.     Source of Infection:  Antibiotic / Last Day: NA    Palliative Care / Advanced Care Planning  Code Status: full code  Patient/Family agreeable to Hospice/Palliative (Y/N)?  Summary of Goals of Care Conversation:    Discharging Provider:  Fernando Mendoza NP  Contact Info: Cell [phone] - Please call with any questions or concerns.    Outpatient Provider: Rahel Ochoa PMJENNIFER

## 2023-10-11 NOTE — PROGRESS NOTE ADULT - PROBLEM SELECTOR PLAN 1
PEG placement today   NPO for procedure
PEG placement 10/10  Start Tube feeds advance as tolerated  Start Miralax QD  Monitor tube feeds tolerance  Please keep gauze on top of PEG bumper   Keep binder on
PEG placement today   NPO for procedure

## 2023-10-11 NOTE — PROGRESS NOTE ADULT - ASSESSMENT
77 year old F PMH HTN, DM type 2 (diet controlled, not on insulin), CKD stage 3 on HD MWF, CVA, dementia with occasional agitation, Presents to the ED for a dislodged 18F PEG tube as well as re-injury with mechanical wheelchair of LLE.    Dislodged Gastrostomy Tube  GI appreciated, recently discharged from [facility] after being admitted with clogged PEG tube  PEG put in by GI on 10/10/23  Continue IVF for now, anticipate starting tube feeding today, will start all medications    Left Metatarsal Fracture  Injury of Left Foot  cast got caught with motorized wheelchair  Xrays reviewed, appreciated podiatry consult with Dr. Batres - recommend walking boot to immobilize left foot fractures, no surgical intervention  pain management     Moderate Alzheimer's Dementia with Agitation  chronic condition  HHA and  reinforced importance of anxiolytics especially at night  Continue diazepam with hold parameters  restart sertraline and seroquel asap once peg is placed and functional    ESRD on HD  renal following, continue HD, plan for today    DM2  chronic condition, last A1c 5.7, diet controlled  no needs for medications or accuchecks    HTN  HLD  chronic conditions  Plan to restart meds today    Prophylactic Measure  heparin    updated  arnulfo on plan of care 77 year old F PMH HTN, DM type 2 (diet controlled, not on insulin), CKD stage 3 on HD MWF, CVA, dementia with occasional agitation, Presents to the ED for a dislodged 18F PEG tube as well as re-injury with mechanical wheelchair of LLE.    Dislodged Gastrostomy Tube  GI appreciated, recently discharged from [facility] after being admitted with clogged PEG tube  PEG put in by GI on 10/10/23  Stopped IVF, anticipate starting tube feeding today, will start all medications    Left Metatarsal Fracture  Injury of Left Foot  cast got caught with motorized wheelchair  Xrays reviewed, appreciated podiatry consult with Dr. Batres - recommend walking boot to immobilize left foot fractures, no surgical intervention  pain management     Moderate Alzheimer's Dementia with Agitation  chronic condition  HHA and  reinforced importance of anxiolytics especially at night  Continue diazepam with hold parameters  restart sertraline and seroquel today    ESRD on HD  renal following, continue HD, plan for today    DM2  chronic condition, last A1c 5.7, diet controlled  no needs for medications or accuchecks    HTN  HLD  chronic conditions  Plan to restart meds today    Prophylactic Measure  heparin    updated  arnulfo on plan of care

## 2023-10-11 NOTE — PROGRESS NOTE ADULT - SUBJECTIVE AND OBJECTIVE BOX
Patient is a 77y old  Female who presents with a chief complaint of PEG replacement (10 Oct 2023 21:14)    Patient seen and examined at bedside.  no acute overnight events    ALLERGIES:  sulfa drugs (Rash)        Vital Signs Last 24 Hrs  T(F): 98.6 (11 Oct 2023 05:00), Max: 98.6 (11 Oct 2023 05:00)  HR: 65 (11 Oct 2023 05:00) (65 - 83)  BP: 132/77 (11 Oct 2023 05:00) (132/77 - 166/61)  RR: 16 (11 Oct 2023 05:00) (16 - 21)  SpO2: 93% (11 Oct 2023 05:00) (90% - 100%)  I&O's Summary    10 Oct 2023 07:01  -  11 Oct 2023 07:00  --------------------------------------------------------  IN: 650 mL / OUT: 150 mL / NET: 500 mL      MEDICATIONS:  dextrose 5% + sodium chloride 0.45%. 1000 milliLiter(s) IV Continuous <Continuous>  diazepam  Injectable 5 milliGRAM(s) IV Push <User Schedule>  epoetin valeriy (EPOGEN) Injectable 51117 Unit(s) IV Push <User Schedule>  famotidine  IVPB 20 milliGRAM(s) IV Intermittent daily  heparin   Injectable 5000 Unit(s) SubCutaneous every 12 hours      PHYSICAL EXAM:  General: NAD, A/O x 3  ENT: MMM, no thrush  Neck: Supple, No JVD  Lungs: Clear to auscultation bilaterally, non labored, good air entry  Cardio: S1S2 regular  Abdomen: Soft, Nontender, Nondistended; Bowel sounds present, +PEG  Extremities: No cyanosis, No edema, LT foot in soft cast    LABS:                        9.7    7.36  )-----------( 156      ( 11 Oct 2023 06:25 )             30.1     10-11    140  |  101  |  55  ----------------------------<  132  3.9   |  28  |  2.76    Ca    8.9      11 Oct 2023 06:25    TPro  6.8  /  Alb  2.9  /  TBili  0.4  /  DBili  x   /  AST  9   /  ALT  11  /  AlkPhos  61  10-09      PT/INR - ( 08 Oct 2023 12:15 )   PT: 10.2 sec;   INR: 0.89 ratio         PTT - ( 08 Oct 2023 12:15 )  PTT:33.4 sec                      POCT Blood Glucose.: 98 mg/dL (10 Oct 2023 14:08)  POCT Blood Glucose.: 99 mg/dL (10 Oct 2023 12:46)      Urinalysis Basic - ( 11 Oct 2023 06:25 )    Color: x / Appearance: x / SG: x / pH: x  Gluc: 132 mg/dL / Ketone: x  / Bili: x / Urobili: x   Blood: x / Protein: x / Nitrite: x   Leuk Esterase: x / RBC: x / WBC x   Sq Epi: x / Non Sq Epi: x / Bacteria: x        Culture - Blood (collected 05 Oct 2023 11:32)  Source: .Blood Blood-Peripheral  Final Report (10 Oct 2023 15:00):    No growth at 5 days    Culture - Blood (collected 05 Oct 2023 11:10)  Source: .Blood Blood-Peripheral  Final Report (10 Oct 2023 15:00):    No growth at 5 days          RADIOLOGY & ADDITIONAL TESTS:    Care Discussed with Consultants/Other Providers:

## 2023-10-12 NOTE — PROGRESS NOTE ADULT - SUBJECTIVE AND OBJECTIVE BOX
No distress    Vital Signs Last 24 Hrs  T(C): 37 (10-12-23 @ 14:40), Max: 37.2 (10-12-23 @ 05:00)  T(F): 98.6 (10-12-23 @ 14:40), Max: 99 (10-12-23 @ 05:00)  HR: 79 (10-12-23 @ 14:40) (74 - 79)  BP: 140/51 (10-12-23 @ 14:40) (134/61 - 153/76)  RR: 20 (10-12-23 @ 14:40) (15 - 20)  SpO2: 97% (10-12-23 @ 14:40) (87% - 99%)    I&O's Detail    11 Oct 2023 07:01  -  12 Oct 2023 07:00  --------------------------------------------------------  IN:    Enteral Tube Flush: 120 mL    Nepro with Carb Steady: 240 mL  Total IN: 360 mL    OUT:    Other (mL): 2000 mL  Total OUT: 2000 mL    12 Oct 2023 07:01  -  12 Oct 2023 19:24  --------------------------------------------------------  IN:    Enteral Tube Flush: 120 mL    Nepro with Carb Steady: 480 mL  Total IN: 600 mL    OUT:  Total OUT: 0 mL    Total NET: 600 mL    s1s2  b/l air entry  soft, ND  no edema, AVF patent                                         9.7    8.59  )-----------( 158      ( 12 Oct 2023 05:23 )             30.1     12 Oct 2023 05:23    138    |  100    |  38     ----------------------------<  143    3.4     |  31     |  2.05     Ca    9.1        12 Oct 2023 05:23    amLODIPine   Tablet 2.5 milliGRAM(s) Oral daily  atorvastatin 10 milliGRAM(s) Oral at bedtime  clopidogrel Tablet 75 milliGRAM(s) Oral daily  diazepam    Tablet 5 milliGRAM(s) Oral two times a day  epoetin valeriy (PROCRIT) Injectable 49688 Unit(s) IV Push <User Schedule>  heparin   Injectable 5000 Unit(s) SubCutaneous every 12 hours  lidocaine/prilocaine Cream 1 Application(s) Topical <User Schedule>  polyethylene glycol 3350 17 Gram(s) Oral two times a day  QUEtiapine 25 milliGRAM(s) Oral daily  senna 2 Tablet(s) Oral at bedtime  sertraline 100 milliGRAM(s) Oral two times a day    A/P:    Adm w/dislodged feeding tube  S/p new Peg tube per GI  Tolerates feeds  ESRD on HD MWF  Next HD tomorrow as op    [phone]

## 2023-10-12 NOTE — DISCHARGE NOTE NURSING/CASE MANAGEMENT/SOCIAL WORK - PATIENT PORTAL LINK FT
You can access the FollowMyHealth Patient Portal offered by [facility] by registering at the following website: [email or web address]. By joining [employer]’s FollowMyHealth portal, you will also be able to view your health information using other applications (apps) compatible with our system.

## 2023-10-12 NOTE — PROGRESS NOTE ADULT - PROVIDER SPECIALTY LIST ADULT
Gastroenterology
Hospitalist
Nephrology
Gastroenterology
Nephrology
Nephrology
Hospitalist
Gastroenterology

## 2023-10-12 NOTE — PROGRESS NOTE ADULT - REASON FOR ADMISSION
PEG re-placement
PEG replacement
PEG re-placement

## 2023-10-12 NOTE — PROGRESS NOTE ADULT - ASSESSMENT
77 year old F PMH HTN, DM type 2 (diet controlled, not on insulin), CKD stage 3 on HD MWF, CVA, dementia with occasional agitation, Presents to the ED for a dislodged 18F PEG tube as well as re-injury with mechanical wheelchair of LLE.    Dislodged Gastrostomy Tube  GI appreciated, recently discharged from [facility] after being admitted with clogged PEG tube  PEG put in by GI on 10/10/23  Tolerating tube feeds and medications    Left Metatarsal Fracture  Injury of Left Foot  cast got caught with motorized wheelchair  Xrays reviewed, appreciated podiatry consult with Dr. Batres - recommend walking boot to immobilize left foot fractures, no surgical intervention  pain management     Moderate Alzheimer's Dementia with Agitation  chronic condition  HHA and  reinforced importance of anxiolytics especially at night  Continue diazepam with hold parameters  Continue sertraline and seroquel    ESRD on HD  renal following, continue HD, plan for today    DM2  chronic condition, last A1c 5.7, diet controlled  no needs for medications or accuchecks    HTN  HLD  chronic conditions  Plan to restart meds today    Prophylactic Measure  heparin    Discharge planning for home  updated  arnulfo on plan of care 77 year old F PMH HTN, DM type 2 (diet controlled, not on insulin), CKD stage 3 on HD MWF, CVA, dementia with occasional agitation, Presents to the ED for a dislodged 18F PEG tube as well as re-injury with mechanical wheelchair of LLE.    Dislodged Gastrostomy Tube  GI appreciated, recently discharged from [facility] after being admitted with clogged PEG tube  PEG put in by GI on 10/10/23  Tolerating tube feeds and medications    Left Metatarsal Fracture  Injury of Left Foot  cast got caught with motorized wheelchair  Xrays reviewed, appreciated podiatry consult with Dr. Batres - recommend walking boot to immobilize left foot fractures, no surgical intervention  pain management     Moderate Alzheimer's Dementia with Agitation  chronic condition  HHA and  reinforced importance of anxiolytics especially at night  Continue diazepam with hold parameters  Continue sertraline and seroquel    ESRD on HD  renal following, continue HD, plan for today    DM2  chronic condition, last A1c 5.7, diet controlled  no needs for medications or accuchecks    HTN  HLD  chronic conditions  Plan to restart meds today    Prophylactic Measure  heparin    Discharge planning for home  updated  arnulfo on plan of care

## 2023-10-12 NOTE — PROGRESS NOTE ADULT - NS ATTEND AMEND GEN_ALL_CORE FT
Dislodged peg tube  - replaced yesterday via endoscopy  - begin feedings  - monitor for residuals  - if tolerates can potentially d/c home
stable for discharge home  F/U PCP, renal  ESRD on HD as per schedule  D/c 45 min
Agree with above,    kindly reconsult us if we can be of service.
Dislodged PEG tube  - For PEG replacement today  - npo  - follow up GI recs
Dislodged PEG tube  - for endoscopy today at 230PM    ESRD on HD  - normally MWF  - nephrology consulted    Dementia  - near baseline  - aide at bedside

## 2023-10-12 NOTE — PROGRESS NOTE ADULT - SUBJECTIVE AND OBJECTIVE BOX
Patient is a 78y old  Female who presents with a chief complaint of PEG re-placement (11 Oct 2023 16:28)    Patient seen and examined at bedside.  no acute overnight events    ALLERGIES:  sulfa drugs (Rash)        Vital Signs Last 24 Hrs  T(F): 99 (12 Oct 2023 05:00), Max: 99 (12 Oct 2023 05:00)  HR: 74 (12 Oct 2023 05:00) (72 - 84)  BP: 134/61 (12 Oct 2023 05:00) (123/81 - 160/73)  RR: 20 (12 Oct 2023 05:00) (15 - 20)  SpO2: 99% (12 Oct 2023 06:58) (87% - 99%)  I&O's Summary    11 Oct 2023 07:01  -  12 Oct 2023 07:00  --------------------------------------------------------  IN: 360 mL / OUT: 2000 mL / NET: -1640 mL      MEDICATIONS:  amLODIPine   Tablet 2.5 milliGRAM(s) Oral daily  atorvastatin 10 milliGRAM(s) Oral at bedtime  clopidogrel Tablet 75 milliGRAM(s) Oral daily  diazepam    Tablet 5 milliGRAM(s) Oral two times a day  epoetin valeriy (PROCRIT) Injectable 03270 Unit(s) IV Push <User Schedule>  heparin   Injectable 5000 Unit(s) SubCutaneous every 12 hours  lidocaine/prilocaine Cream 1 Application(s) Topical <User Schedule>  polyethylene glycol 3350 17 Gram(s) Oral two times a day  potassium chloride   Solution 40 milliEquivalent(s) Oral once  QUEtiapine 25 milliGRAM(s) Oral daily  senna 2 Tablet(s) Oral at bedtime  sertraline 100 milliGRAM(s) Oral two times a day      PHYSICAL EXAM:  General: NAD, A/O x 3  ENT: MMM, no thrush  Neck: Supple, No JVD  Lungs: Clear to auscultation bilaterally, non labored, good air entry  Cardio: S1S2 regular  Abdomen: Soft, Nontender, Nondistended; Bowel sounds present, +PEG  Extremities: No cyanosis, No edema, LT foot soft cast    LABS:                        9.7    8.59  )-----------( 158      ( 12 Oct 2023 05:23 )             30.1     10-12    138  |  100  |  38  ----------------------------<  143  3.4   |  31  |  2.05    Ca    9.1      12 Oct 2023 05:23                                  Urinalysis Basic - ( 12 Oct 2023 05:23 )    Color: x / Appearance: x / SG: x / pH: x  Gluc: 143 mg/dL / Ketone: x  / Bili: x / Urobili: x   Blood: x / Protein: x / Nitrite: x   Leuk Esterase: x / RBC: x / WBC x   Sq Epi: x / Non Sq Epi: x / Bacteria: x        Culture - Blood (collected 05 Oct 2023 11:32)  Source: .Blood Blood-Peripheral  Final Report (10 Oct 2023 15:00):    No growth at 5 days    Culture - Blood (collected 05 Oct 2023 11:10)  Source: .Blood Blood-Peripheral  Final Report (10 Oct 2023 15:00):    No growth at 5 days          RADIOLOGY & ADDITIONAL TESTS:    Care Discussed with Consultants/Other Providers:    Patient is a 78y old  Female who presents with a chief complaint of PEG re-placement (11 Oct 2023 16:28)    Patient seen and examined at bedside.  no acute overnight events    ALLERGIES:  sulfa drugs (Rash)        Vital Signs Last 24 Hrs  T(F): 99 (12 Oct 2023 05:00), Max: 99 (12 Oct 2023 05:00)  HR: 74 (12 Oct 2023 05:00) (72 - 84)  BP: 134/61 (12 Oct 2023 05:00) (123/81 - 160/73)  RR: 20 (12 Oct 2023 05:00) (15 - 20)  SpO2: 99% (12 Oct 2023 06:58) (87% - 99%)  I&O's Summary    11 Oct 2023 07:01  -  12 Oct 2023 07:00  --------------------------------------------------------  IN: 360 mL / OUT: 2000 mL / NET: -1640 mL      MEDICATIONS:  amLODIPine   Tablet 2.5 milliGRAM(s) Oral daily  atorvastatin 10 milliGRAM(s) Oral at bedtime  clopidogrel Tablet 75 milliGRAM(s) Oral daily  diazepam    Tablet 5 milliGRAM(s) Oral two times a day  epoetin valeriy (PROCRIT) Injectable 63431 Unit(s) IV Push <User Schedule>  heparin   Injectable 5000 Unit(s) SubCutaneous every 12 hours  lidocaine/prilocaine Cream 1 Application(s) Topical <User Schedule>  polyethylene glycol 3350 17 Gram(s) Oral two times a day  potassium chloride   Solution 40 milliEquivalent(s) Oral once  QUEtiapine 25 milliGRAM(s) Oral daily  senna 2 Tablet(s) Oral at bedtime  sertraline 100 milliGRAM(s) Oral two times a day      PHYSICAL EXAM:  General: NAD, A/O x 3  ENT: MMM, no thrush  Neck: Supple, No JVD  Lungs: Clear to auscultation bilaterally, non labored, good air entry  Cardio: S1S2 regular  Abdomen: Soft, Nontender, Nondistended; Bowel sounds present, +PEG  Extremities: No cyanosis, No edema, LT foot soft cast    LABS:                        9.7    8.59  )-----------( 158      ( 12 Oct 2023 05:23 )             30.1     10-12    138  |  100  |  38  ----------------------------<  143  3.4   |  31  |  2.05    Ca    9.1      12 Oct 2023 05:23    Urinalysis Basic - ( 12 Oct 2023 05:23 )    Color: x / Appearance: x / SG: x / pH: x  Gluc: 143 mg/dL / Ketone: x  / Bili: x / Urobili: x   Blood: x / Protein: x / Nitrite: x   Leuk Esterase: x / RBC: x / WBC x   Sq Epi: x / Non Sq Epi: x / Bacteria: x    Culture - Blood (collected 05 Oct 2023 11:32)  Source: .Blood Blood-Peripheral  Final Report (10 Oct 2023 15:00):    No growth at 5 days    Culture - Blood (collected 05 Oct 2023 11:10)  Source: .Blood Blood-Peripheral  Final Report (10 Oct 2023 15:00):    No growth at 5 days

## 2024-01-01 ENCOUNTER — RESULT REVIEW (OUTPATIENT)
Age: 79
End: 2024-01-01

## 2024-01-01 ENCOUNTER — TRANSCRIPTION ENCOUNTER (OUTPATIENT)
Age: 79
End: 2024-01-01

## 2024-01-01 ENCOUNTER — INPATIENT (INPATIENT)
Facility: HOSPITAL | Age: 79
LOS: 4 days | Discharge: HOME | DRG: 312 | End: 2024-02-28
Attending: HOSPITALIST | Admitting: STUDENT IN AN ORGANIZED HEALTH CARE EDUCATION/TRAINING PROGRAM
Payer: MEDICARE

## 2024-01-01 ENCOUNTER — EMERGENCY (EMERGENCY)
Facility: HOSPITAL | Age: 79
LOS: 1 days | End: 2024-01-01
Attending: EMERGENCY MEDICINE
Payer: MEDICARE

## 2024-01-01 VITALS — HEIGHT: 64 IN | WEIGHT: 220.46 LBS

## 2024-01-01 VITALS
HEIGHT: 64 IN | RESPIRATION RATE: 18 BRPM | TEMPERATURE: 98 F | SYSTOLIC BLOOD PRESSURE: 99 MMHG | WEIGHT: 195.11 LBS | OXYGEN SATURATION: 95 % | HEART RATE: 68 BPM | DIASTOLIC BLOOD PRESSURE: 63 MMHG

## 2024-01-01 VITALS — TEMPERATURE: 99 F | HEART RATE: 73 BPM | SYSTOLIC BLOOD PRESSURE: 153 MMHG | DIASTOLIC BLOOD PRESSURE: 75 MMHG

## 2024-01-01 DIAGNOSIS — I10 ESSENTIAL (PRIMARY) HYPERTENSION: ICD-10-CM

## 2024-01-01 DIAGNOSIS — Z98.890 OTHER SPECIFIED POSTPROCEDURAL STATES: Chronic | ICD-10-CM

## 2024-01-01 DIAGNOSIS — Z90.49 ACQUIRED ABSENCE OF OTHER SPECIFIED PARTS OF DIGESTIVE TRACT: Chronic | ICD-10-CM

## 2024-01-01 DIAGNOSIS — Z93.1 GASTROSTOMY STATUS: Chronic | ICD-10-CM

## 2024-01-01 DIAGNOSIS — F03.90 UNSPECIFIED DEMENTIA WITHOUT BEHAVIORAL DISTURBANCE: ICD-10-CM

## 2024-01-01 DIAGNOSIS — Z29.9 ENCOUNTER FOR PROPHYLACTIC MEASURES, UNSPECIFIED: ICD-10-CM

## 2024-01-01 DIAGNOSIS — E11.9 TYPE 2 DIABETES MELLITUS WITHOUT COMPLICATIONS: ICD-10-CM

## 2024-01-01 DIAGNOSIS — R55 SYNCOPE AND COLLAPSE: ICD-10-CM

## 2024-01-01 DIAGNOSIS — N18.6 END STAGE RENAL DISEASE: ICD-10-CM

## 2024-01-01 LAB
A1C WITH ESTIMATED AVERAGE GLUCOSE RESULT: 5.4 % — SIGNIFICANT CHANGE UP (ref 4–5.6)
ALBUMIN SERPL ELPH-MCNC: 3.4 G/DL — SIGNIFICANT CHANGE UP (ref 3.3–5)
ALBUMIN SERPL ELPH-MCNC: 3.6 G/DL — SIGNIFICANT CHANGE UP (ref 3.3–5)
ALP SERPL-CCNC: 68 U/L — SIGNIFICANT CHANGE UP (ref 40–120)
ALP SERPL-CCNC: 72 U/L — SIGNIFICANT CHANGE UP (ref 40–120)
ALT FLD-CCNC: 14 U/L — SIGNIFICANT CHANGE UP (ref 12–78)
ALT FLD-CCNC: 14 U/L — SIGNIFICANT CHANGE UP (ref 12–78)
ANION GAP SERPL CALC-SCNC: 6 MMOL/L — SIGNIFICANT CHANGE UP (ref 5–17)
ANION GAP SERPL CALC-SCNC: 6 MMOL/L — SIGNIFICANT CHANGE UP (ref 5–17)
ANION GAP SERPL CALC-SCNC: 7 MMOL/L — SIGNIFICANT CHANGE UP (ref 5–17)
ANION GAP SERPL CALC-SCNC: 8 MMOL/L — SIGNIFICANT CHANGE UP (ref 5–17)
APTT BLD: 30.4 SEC — SIGNIFICANT CHANGE UP (ref 24.5–35.6)
APTT BLD: 32 SEC — SIGNIFICANT CHANGE UP (ref 24.5–35.6)
AST SERPL-CCNC: 10 U/L — LOW (ref 15–37)
AST SERPL-CCNC: 11 U/L — LOW (ref 15–37)
BASOPHILS # BLD AUTO: 0 K/UL — SIGNIFICANT CHANGE UP (ref 0–0.2)
BASOPHILS NFR BLD AUTO: 0 % — SIGNIFICANT CHANGE UP (ref 0–2)
BILIRUB SERPL-MCNC: 0.4 MG/DL — SIGNIFICANT CHANGE UP (ref 0.2–1.2)
BILIRUB SERPL-MCNC: 0.4 MG/DL — SIGNIFICANT CHANGE UP (ref 0.2–1.2)
BUN SERPL-MCNC: 24 MG/DL — HIGH (ref 7–23)
BUN SERPL-MCNC: 28 MG/DL — HIGH (ref 7–23)
BUN SERPL-MCNC: 47 MG/DL — HIGH (ref 7–23)
BUN SERPL-MCNC: 55 MG/DL — HIGH (ref 7–23)
CALCIUM SERPL-MCNC: 9 MG/DL — SIGNIFICANT CHANGE UP (ref 8.5–10.1)
CALCIUM SERPL-MCNC: 9.2 MG/DL — SIGNIFICANT CHANGE UP (ref 8.5–10.1)
CALCIUM SERPL-MCNC: 9.5 MG/DL — SIGNIFICANT CHANGE UP (ref 8.5–10.1)
CALCIUM SERPL-MCNC: 9.6 MG/DL — SIGNIFICANT CHANGE UP (ref 8.5–10.1)
CHLORIDE SERPL-SCNC: 97 MMOL/L — SIGNIFICANT CHANGE UP (ref 96–108)
CHLORIDE SERPL-SCNC: 97 MMOL/L — SIGNIFICANT CHANGE UP (ref 96–108)
CHLORIDE SERPL-SCNC: 98 MMOL/L — SIGNIFICANT CHANGE UP (ref 96–108)
CHLORIDE SERPL-SCNC: 98 MMOL/L — SIGNIFICANT CHANGE UP (ref 96–108)
CO2 SERPL-SCNC: 32 MMOL/L — HIGH (ref 22–31)
CO2 SERPL-SCNC: 33 MMOL/L — HIGH (ref 22–31)
CO2 SERPL-SCNC: 36 MMOL/L — HIGH (ref 22–31)
CO2 SERPL-SCNC: 36 MMOL/L — HIGH (ref 22–31)
CREAT SERPL-MCNC: 1.1 MG/DL — SIGNIFICANT CHANGE UP (ref 0.5–1.3)
CREAT SERPL-MCNC: 1.4 MG/DL — HIGH (ref 0.5–1.3)
CREAT SERPL-MCNC: 1.7 MG/DL — HIGH (ref 0.5–1.3)
CREAT SERPL-MCNC: 2.4 MG/DL — HIGH (ref 0.5–1.3)
EGFR: 20 ML/MIN/1.73M2 — LOW
EGFR: 30 ML/MIN/1.73M2 — LOW
EGFR: 38 ML/MIN/1.73M2 — LOW
EGFR: 51 ML/MIN/1.73M2 — LOW
EOSINOPHIL # BLD AUTO: 0 K/UL — SIGNIFICANT CHANGE UP (ref 0–0.5)
EOSINOPHIL NFR BLD AUTO: 0 % — SIGNIFICANT CHANGE UP (ref 0–6)
ESTIMATED AVERAGE GLUCOSE: 108 MG/DL — SIGNIFICANT CHANGE UP (ref 68–114)
GLUCOSE SERPL-MCNC: 109 MG/DL — HIGH (ref 70–99)
GLUCOSE SERPL-MCNC: 116 MG/DL — HIGH (ref 70–99)
GLUCOSE SERPL-MCNC: 140 MG/DL — HIGH (ref 70–99)
GLUCOSE SERPL-MCNC: 208 MG/DL — HIGH (ref 70–99)
HCT VFR BLD CALC: 30.7 % — LOW (ref 34.5–45)
HCT VFR BLD CALC: 31.1 % — LOW (ref 34.5–45)
HCT VFR BLD CALC: 31.1 % — LOW (ref 34.5–45)
HCT VFR BLD CALC: 33.7 % — LOW (ref 34.5–45)
HGB BLD-MCNC: 10.1 G/DL — LOW (ref 11.5–15.5)
HGB BLD-MCNC: 10.7 G/DL — LOW (ref 11.5–15.5)
HGB BLD-MCNC: 9.7 G/DL — LOW (ref 11.5–15.5)
HGB BLD-MCNC: 9.9 G/DL — LOW (ref 11.5–15.5)
INR BLD: 0.91 RATIO — SIGNIFICANT CHANGE UP (ref 0.85–1.18)
LYMPHOCYTES # BLD AUTO: 0.57 K/UL — LOW (ref 1–3.3)
LYMPHOCYTES # BLD AUTO: 6 % — LOW (ref 13–44)
MAGNESIUM SERPL-MCNC: 2.6 MG/DL — SIGNIFICANT CHANGE UP (ref 1.6–2.6)
MCHC RBC-ENTMCNC: 29.3 PG — SIGNIFICANT CHANGE UP (ref 27–34)
MCHC RBC-ENTMCNC: 29.4 PG — SIGNIFICANT CHANGE UP (ref 27–34)
MCHC RBC-ENTMCNC: 29.5 PG — SIGNIFICANT CHANGE UP (ref 27–34)
MCHC RBC-ENTMCNC: 29.8 PG — SIGNIFICANT CHANGE UP (ref 27–34)
MCHC RBC-ENTMCNC: 31.2 GM/DL — LOW (ref 32–36)
MCHC RBC-ENTMCNC: 31.8 GM/DL — LOW (ref 32–36)
MCHC RBC-ENTMCNC: 32.2 GM/DL — SIGNIFICANT CHANGE UP (ref 32–36)
MCHC RBC-ENTMCNC: 32.5 GM/DL — SIGNIFICANT CHANGE UP (ref 32–36)
MCV RBC AUTO: 90.9 FL — SIGNIFICANT CHANGE UP (ref 80–100)
MCV RBC AUTO: 92.5 FL — SIGNIFICANT CHANGE UP (ref 80–100)
MCV RBC AUTO: 92.6 FL — SIGNIFICANT CHANGE UP (ref 80–100)
MCV RBC AUTO: 94 FL — SIGNIFICANT CHANGE UP (ref 80–100)
MONOCYTES # BLD AUTO: 0.67 K/UL — SIGNIFICANT CHANGE UP (ref 0–0.9)
MONOCYTES NFR BLD AUTO: 7 % — SIGNIFICANT CHANGE UP (ref 2–14)
NEUTROPHILS # BLD AUTO: 8.3 K/UL — HIGH (ref 1.8–7.4)
NEUTROPHILS NFR BLD AUTO: 87 % — HIGH (ref 43–77)
NRBC # BLD: 0 /100 WBCS — SIGNIFICANT CHANGE UP (ref 0–0)
NRBC # BLD: SIGNIFICANT CHANGE UP /100 WBCS (ref 0–0)
PHOSPHATE SERPL-MCNC: 2.3 MG/DL — LOW (ref 2.5–4.5)
PLATELET # BLD AUTO: 151 K/UL — SIGNIFICANT CHANGE UP (ref 150–400)
PLATELET # BLD AUTO: 152 K/UL — SIGNIFICANT CHANGE UP (ref 150–400)
PLATELET # BLD AUTO: 160 K/UL — SIGNIFICANT CHANGE UP (ref 150–400)
PLATELET # BLD AUTO: 176 K/UL — SIGNIFICANT CHANGE UP (ref 150–400)
POTASSIUM SERPL-MCNC: 3.5 MMOL/L — SIGNIFICANT CHANGE UP (ref 3.5–5.3)
POTASSIUM SERPL-MCNC: 3.5 MMOL/L — SIGNIFICANT CHANGE UP (ref 3.5–5.3)
POTASSIUM SERPL-MCNC: 3.7 MMOL/L — SIGNIFICANT CHANGE UP (ref 3.5–5.3)
POTASSIUM SERPL-MCNC: 4.1 MMOL/L — SIGNIFICANT CHANGE UP (ref 3.5–5.3)
POTASSIUM SERPL-SCNC: 3.5 MMOL/L — SIGNIFICANT CHANGE UP (ref 3.5–5.3)
POTASSIUM SERPL-SCNC: 3.5 MMOL/L — SIGNIFICANT CHANGE UP (ref 3.5–5.3)
POTASSIUM SERPL-SCNC: 3.7 MMOL/L — SIGNIFICANT CHANGE UP (ref 3.5–5.3)
POTASSIUM SERPL-SCNC: 4.1 MMOL/L — SIGNIFICANT CHANGE UP (ref 3.5–5.3)
PROT SERPL-MCNC: 6.5 G/DL — SIGNIFICANT CHANGE UP (ref 6–8.3)
PROT SERPL-MCNC: 6.8 G/DL — SIGNIFICANT CHANGE UP (ref 6–8.3)
PROTHROM AB SERPL-ACNC: 10.7 SEC — SIGNIFICANT CHANGE UP (ref 9.5–13)
RBC # BLD: 3.31 M/UL — LOW (ref 3.8–5.2)
RBC # BLD: 3.32 M/UL — LOW (ref 3.8–5.2)
RBC # BLD: 3.42 M/UL — LOW (ref 3.8–5.2)
RBC # BLD: 3.64 M/UL — LOW (ref 3.8–5.2)
RBC # FLD: 17 % — HIGH (ref 10.3–14.5)
RBC # FLD: 17 % — HIGH (ref 10.3–14.5)
RBC # FLD: 17.1 % — HIGH (ref 10.3–14.5)
RBC # FLD: 17.3 % — HIGH (ref 10.3–14.5)
SODIUM SERPL-SCNC: 136 MMOL/L — SIGNIFICANT CHANGE UP (ref 135–145)
SODIUM SERPL-SCNC: 137 MMOL/L — SIGNIFICANT CHANGE UP (ref 135–145)
SODIUM SERPL-SCNC: 140 MMOL/L — SIGNIFICANT CHANGE UP (ref 135–145)
SODIUM SERPL-SCNC: 141 MMOL/L — SIGNIFICANT CHANGE UP (ref 135–145)
TROPONIN I, HIGH SENSITIVITY RESULT: 18.6 NG/L — SIGNIFICANT CHANGE UP
WBC # BLD: 6.9 K/UL — SIGNIFICANT CHANGE UP (ref 3.8–10.5)
WBC # BLD: 8.57 K/UL — SIGNIFICANT CHANGE UP (ref 3.8–10.5)
WBC # BLD: 8.71 K/UL — SIGNIFICANT CHANGE UP (ref 3.8–10.5)
WBC # BLD: 9.54 K/UL — SIGNIFICANT CHANGE UP (ref 3.8–10.5)
WBC # FLD AUTO: 6.9 K/UL — SIGNIFICANT CHANGE UP (ref 3.8–10.5)
WBC # FLD AUTO: 8.57 K/UL — SIGNIFICANT CHANGE UP (ref 3.8–10.5)
WBC # FLD AUTO: 8.71 K/UL — SIGNIFICANT CHANGE UP (ref 3.8–10.5)
WBC # FLD AUTO: 9.54 K/UL — SIGNIFICANT CHANGE UP (ref 3.8–10.5)

## 2024-01-01 PROCEDURE — 36415 COLL VENOUS BLD VENIPUNCTURE: CPT

## 2024-01-01 PROCEDURE — 80048 BASIC METABOLIC PNL TOTAL CA: CPT

## 2024-01-01 PROCEDURE — 85610 PROTHROMBIN TIME: CPT

## 2024-01-01 PROCEDURE — 84484 ASSAY OF TROPONIN QUANT: CPT

## 2024-01-01 PROCEDURE — 93306 TTE W/DOPPLER COMPLETE: CPT

## 2024-01-01 PROCEDURE — 93005 ELECTROCARDIOGRAM TRACING: CPT

## 2024-01-01 PROCEDURE — 85730 THROMBOPLASTIN TIME PARTIAL: CPT

## 2024-01-01 PROCEDURE — 93306 TTE W/DOPPLER COMPLETE: CPT | Mod: 26

## 2024-01-01 PROCEDURE — 82962 GLUCOSE BLOOD TEST: CPT

## 2024-01-01 PROCEDURE — 92950 HEART/LUNG RESUSCITATION CPR: CPT

## 2024-01-01 PROCEDURE — 99285 EMERGENCY DEPT VISIT HI MDM: CPT

## 2024-01-01 PROCEDURE — 99223 1ST HOSP IP/OBS HIGH 75: CPT | Mod: GC

## 2024-01-01 PROCEDURE — 71045 X-RAY EXAM CHEST 1 VIEW: CPT | Mod: 26

## 2024-01-01 PROCEDURE — 99285 EMERGENCY DEPT VISIT HI MDM: CPT | Mod: 25

## 2024-01-01 PROCEDURE — 85027 COMPLETE CBC AUTOMATED: CPT

## 2024-01-01 PROCEDURE — 99232 SBSQ HOSP IP/OBS MODERATE 35: CPT

## 2024-01-01 PROCEDURE — 71045 X-RAY EXAM CHEST 1 VIEW: CPT

## 2024-01-01 PROCEDURE — 93010 ELECTROCARDIOGRAM REPORT: CPT

## 2024-01-01 PROCEDURE — 84100 ASSAY OF PHOSPHORUS: CPT

## 2024-01-01 PROCEDURE — 80053 COMPREHEN METABOLIC PANEL: CPT

## 2024-01-01 PROCEDURE — 31500 INSERT EMERGENCY AIRWAY: CPT

## 2024-01-01 PROCEDURE — 83036 HEMOGLOBIN GLYCOSYLATED A1C: CPT

## 2024-01-01 PROCEDURE — 99282 EMERGENCY DEPT VISIT SF MDM: CPT | Mod: 25

## 2024-01-01 PROCEDURE — 85025 COMPLETE CBC W/AUTO DIFF WBC: CPT

## 2024-01-01 PROCEDURE — 99239 HOSP IP/OBS DSCHRG MGMT >30: CPT | Mod: GC

## 2024-01-01 PROCEDURE — 99261: CPT

## 2024-01-01 PROCEDURE — 83735 ASSAY OF MAGNESIUM: CPT

## 2024-01-01 RX ORDER — QUETIAPINE FUMARATE 200 MG/1
50 TABLET, FILM COATED ORAL DAILY
Refills: 0 | Status: DISCONTINUED | OUTPATIENT
Start: 2024-01-01 | End: 2024-01-01

## 2024-01-01 RX ORDER — CHOLECALCIFEROL (VITAMIN D3) 125 MCG
1000 CAPSULE ORAL DAILY
Refills: 0 | Status: DISCONTINUED | OUTPATIENT
Start: 2024-01-01 | End: 2024-01-01

## 2024-01-01 RX ORDER — SODIUM CHLORIDE 9 MG/ML
1000 INJECTION, SOLUTION INTRAVENOUS
Refills: 0 | Status: DISCONTINUED | OUTPATIENT
Start: 2024-01-01 | End: 2024-01-01

## 2024-01-01 RX ORDER — DEXTROSE 50 % IN WATER 50 %
15 SYRINGE (ML) INTRAVENOUS ONCE
Refills: 0 | Status: DISCONTINUED | OUTPATIENT
Start: 2024-01-01 | End: 2024-01-01

## 2024-01-01 RX ORDER — HYDRALAZINE HCL 50 MG
10 TABLET ORAL THREE TIMES A DAY
Refills: 0 | Status: DISCONTINUED | OUTPATIENT
Start: 2024-01-01 | End: 2024-01-01

## 2024-01-01 RX ORDER — LANOLIN ALCOHOL/MO/W.PET/CERES
3 CREAM (GRAM) TOPICAL AT BEDTIME
Refills: 0 | Status: DISCONTINUED | OUTPATIENT
Start: 2024-01-01 | End: 2024-01-01

## 2024-01-01 RX ORDER — AMLODIPINE BESYLATE 2.5 MG/1
2.5 TABLET ORAL DAILY
Refills: 0 | Status: DISCONTINUED | OUTPATIENT
Start: 2024-01-01 | End: 2024-01-01

## 2024-01-01 RX ORDER — DIAZEPAM 5 MG
1 TABLET ORAL
Refills: 0 | DISCHARGE

## 2024-01-01 RX ORDER — QUETIAPINE FUMARATE 200 MG/1
1 TABLET, FILM COATED ORAL
Refills: 0 | DISCHARGE

## 2024-01-01 RX ORDER — CLOPIDOGREL BISULFATE 75 MG/1
1 TABLET, FILM COATED ORAL
Refills: 0 | DISCHARGE

## 2024-01-01 RX ORDER — SENNA PLUS 8.6 MG/1
2 TABLET ORAL AT BEDTIME
Refills: 0 | Status: DISCONTINUED | OUTPATIENT
Start: 2024-01-01 | End: 2024-01-01

## 2024-01-01 RX ORDER — LACTULOSE 10 G/15ML
20 SOLUTION ORAL ONCE
Refills: 0 | Status: COMPLETED | OUTPATIENT
Start: 2024-01-01 | End: 2024-01-01

## 2024-01-01 RX ORDER — DEXTROSE 50 % IN WATER 50 %
25 SYRINGE (ML) INTRAVENOUS ONCE
Refills: 0 | Status: DISCONTINUED | OUTPATIENT
Start: 2024-01-01 | End: 2024-01-01

## 2024-01-01 RX ORDER — CHOLECALCIFEROL (VITAMIN D3) 125 MCG
1 CAPSULE ORAL
Refills: 0 | DISCHARGE

## 2024-01-01 RX ORDER — DIAZEPAM 5 MG
5 TABLET ORAL
Refills: 0 | Status: DISCONTINUED | OUTPATIENT
Start: 2024-01-01 | End: 2024-01-01

## 2024-01-01 RX ORDER — INSULIN LISPRO 100/ML
VIAL (ML) SUBCUTANEOUS EVERY 6 HOURS
Refills: 0 | Status: DISCONTINUED | OUTPATIENT
Start: 2024-01-01 | End: 2024-01-01

## 2024-01-01 RX ORDER — ERYTHROPOIETIN 10000 [IU]/ML
10000 INJECTION, SOLUTION INTRAVENOUS; SUBCUTANEOUS
Refills: 0 | Status: DISCONTINUED | OUTPATIENT
Start: 2024-01-01 | End: 2024-01-01

## 2024-01-01 RX ORDER — GLUCAGON INJECTION, SOLUTION 0.5 MG/.1ML
1 INJECTION, SOLUTION SUBCUTANEOUS ONCE
Refills: 0 | Status: DISCONTINUED | OUTPATIENT
Start: 2024-01-01 | End: 2024-01-01

## 2024-01-01 RX ORDER — MINERAL OIL
133 OIL (ML) MISCELLANEOUS ONCE
Refills: 0 | Status: COMPLETED | OUTPATIENT
Start: 2024-01-01 | End: 2024-01-01

## 2024-01-01 RX ORDER — POLYETHYLENE GLYCOL 3350 17 G/17G
17 POWDER, FOR SOLUTION ORAL ONCE
Refills: 0 | Status: COMPLETED | OUTPATIENT
Start: 2024-01-01 | End: 2024-01-01

## 2024-01-01 RX ORDER — DEXTROSE 50 % IN WATER 50 %
12.5 SYRINGE (ML) INTRAVENOUS ONCE
Refills: 0 | Status: DISCONTINUED | OUTPATIENT
Start: 2024-01-01 | End: 2024-01-01

## 2024-01-01 RX ORDER — SERTRALINE 25 MG/1
100 TABLET, FILM COATED ORAL
Refills: 0 | Status: DISCONTINUED | OUTPATIENT
Start: 2024-01-01 | End: 2024-01-01

## 2024-01-01 RX ORDER — ONDANSETRON 8 MG/1
4 TABLET, FILM COATED ORAL EVERY 8 HOURS
Refills: 0 | Status: DISCONTINUED | OUTPATIENT
Start: 2024-01-01 | End: 2024-01-01

## 2024-01-01 RX ORDER — FAMOTIDINE 10 MG/ML
20 INJECTION INTRAVENOUS DAILY
Refills: 0 | Status: DISCONTINUED | OUTPATIENT
Start: 2024-01-01 | End: 2024-01-01

## 2024-01-01 RX ORDER — AMLODIPINE BESYLATE 2.5 MG/1
2.5 TABLET ORAL ONCE
Refills: 0 | Status: COMPLETED | OUTPATIENT
Start: 2024-01-01 | End: 2024-01-01

## 2024-01-01 RX ORDER — AMLODIPINE BESYLATE 2.5 MG/1
1 TABLET ORAL
Refills: 0 | DISCHARGE

## 2024-01-01 RX ORDER — ACETAMINOPHEN 500 MG
650 TABLET ORAL EVERY 6 HOURS
Refills: 0 | Status: DISCONTINUED | OUTPATIENT
Start: 2024-01-01 | End: 2024-01-01

## 2024-01-01 RX ORDER — FAMOTIDINE 10 MG/ML
1 INJECTION INTRAVENOUS
Refills: 0 | DISCHARGE

## 2024-01-01 RX ORDER — HEPARIN SODIUM 5000 [USP'U]/ML
5000 INJECTION INTRAVENOUS; SUBCUTANEOUS EVERY 12 HOURS
Refills: 0 | Status: DISCONTINUED | OUTPATIENT
Start: 2024-01-01 | End: 2024-01-01

## 2024-01-01 RX ORDER — ATORVASTATIN CALCIUM 80 MG/1
1 TABLET, FILM COATED ORAL
Refills: 0 | DISCHARGE

## 2024-01-01 RX ORDER — SERTRALINE 25 MG/1
1 TABLET, FILM COATED ORAL
Refills: 0 | DISCHARGE

## 2024-01-01 RX ORDER — HYDRALAZINE HCL 50 MG
1 TABLET ORAL
Qty: 90 | Refills: 0
Start: 2024-01-01 | End: 2024-01-01

## 2024-01-01 RX ORDER — HYDRALAZINE HCL 50 MG
10 TABLET ORAL EVERY 6 HOURS
Refills: 0 | Status: DISCONTINUED | OUTPATIENT
Start: 2024-01-01 | End: 2024-01-01

## 2024-01-01 RX ORDER — CLOPIDOGREL BISULFATE 75 MG/1
75 TABLET, FILM COATED ORAL DAILY
Refills: 0 | Status: DISCONTINUED | OUTPATIENT
Start: 2024-01-01 | End: 2024-01-01

## 2024-01-01 RX ORDER — AMLODIPINE BESYLATE 2.5 MG/1
5 TABLET ORAL DAILY
Refills: 0 | Status: DISCONTINUED | OUTPATIENT
Start: 2024-01-01 | End: 2024-01-01

## 2024-01-01 RX ORDER — AMLODIPINE BESYLATE 2.5 MG/1
1 TABLET ORAL
Qty: 30 | Refills: 0
Start: 2024-01-01 | End: 2024-01-01

## 2024-01-01 RX ADMIN — AMLODIPINE BESYLATE 2.5 MILLIGRAM(S): 2.5 TABLET ORAL at 05:42

## 2024-01-01 RX ADMIN — Medication 10 MILLIGRAM(S): at 22:08

## 2024-01-01 RX ADMIN — HEPARIN SODIUM 5000 UNIT(S): 5000 INJECTION INTRAVENOUS; SUBCUTANEOUS at 06:00

## 2024-01-01 RX ADMIN — POLYETHYLENE GLYCOL 3350 17 GRAM(S): 17 POWDER, FOR SOLUTION ORAL at 17:29

## 2024-01-01 RX ADMIN — HEPARIN SODIUM 5000 UNIT(S): 5000 INJECTION INTRAVENOUS; SUBCUTANEOUS at 06:22

## 2024-01-01 RX ADMIN — QUETIAPINE FUMARATE 50 MILLIGRAM(S): 200 TABLET, FILM COATED ORAL at 14:43

## 2024-01-01 RX ADMIN — AMLODIPINE BESYLATE 5 MILLIGRAM(S): 2.5 TABLET ORAL at 05:57

## 2024-01-01 RX ADMIN — AMLODIPINE BESYLATE 2.5 MILLIGRAM(S): 2.5 TABLET ORAL at 06:00

## 2024-01-01 RX ADMIN — Medication 2: at 17:46

## 2024-01-01 RX ADMIN — Medication 1: at 18:10

## 2024-01-01 RX ADMIN — SERTRALINE 100 MILLIGRAM(S): 25 TABLET, FILM COATED ORAL at 17:40

## 2024-01-01 RX ADMIN — HEPARIN SODIUM 5000 UNIT(S): 5000 INJECTION INTRAVENOUS; SUBCUTANEOUS at 17:28

## 2024-01-01 RX ADMIN — Medication 1: at 12:40

## 2024-01-01 RX ADMIN — Medication 5 MILLIGRAM(S): at 17:40

## 2024-01-01 RX ADMIN — Medication 10 MILLIGRAM(S): at 06:21

## 2024-01-01 RX ADMIN — CLOPIDOGREL BISULFATE 75 MILLIGRAM(S): 75 TABLET, FILM COATED ORAL at 12:04

## 2024-01-01 RX ADMIN — HEPARIN SODIUM 5000 UNIT(S): 5000 INJECTION INTRAVENOUS; SUBCUTANEOUS at 05:55

## 2024-01-01 RX ADMIN — SERTRALINE 100 MILLIGRAM(S): 25 TABLET, FILM COATED ORAL at 06:22

## 2024-01-01 RX ADMIN — SERTRALINE 100 MILLIGRAM(S): 25 TABLET, FILM COATED ORAL at 17:59

## 2024-01-01 RX ADMIN — AMLODIPINE BESYLATE 2.5 MILLIGRAM(S): 2.5 TABLET ORAL at 05:56

## 2024-01-01 RX ADMIN — HEPARIN SODIUM 5000 UNIT(S): 5000 INJECTION INTRAVENOUS; SUBCUTANEOUS at 17:37

## 2024-01-01 RX ADMIN — Medication 5 MILLIGRAM(S): at 05:43

## 2024-01-01 RX ADMIN — Medication 10 MILLIGRAM(S): at 14:42

## 2024-01-01 RX ADMIN — Medication 1000 UNIT(S): at 12:12

## 2024-01-01 RX ADMIN — FAMOTIDINE 20 MILLIGRAM(S): 10 INJECTION INTRAVENOUS at 14:43

## 2024-01-01 RX ADMIN — Medication 1000 UNIT(S): at 12:04

## 2024-01-01 RX ADMIN — SERTRALINE 100 MILLIGRAM(S): 25 TABLET, FILM COATED ORAL at 17:37

## 2024-01-01 RX ADMIN — SERTRALINE 100 MILLIGRAM(S): 25 TABLET, FILM COATED ORAL at 17:28

## 2024-01-01 RX ADMIN — HEPARIN SODIUM 5000 UNIT(S): 5000 INJECTION INTRAVENOUS; SUBCUTANEOUS at 05:42

## 2024-01-01 RX ADMIN — Medication 1: at 17:51

## 2024-01-01 RX ADMIN — Medication 10 MILLIGRAM(S): at 12:46

## 2024-01-01 RX ADMIN — CLOPIDOGREL BISULFATE 75 MILLIGRAM(S): 75 TABLET, FILM COATED ORAL at 12:10

## 2024-01-01 RX ADMIN — FAMOTIDINE 20 MILLIGRAM(S): 10 INJECTION INTRAVENOUS at 12:39

## 2024-01-01 RX ADMIN — Medication 1000 UNIT(S): at 12:39

## 2024-01-01 RX ADMIN — Medication 5 MILLIGRAM(S): at 05:57

## 2024-01-01 RX ADMIN — QUETIAPINE FUMARATE 50 MILLIGRAM(S): 200 TABLET, FILM COATED ORAL at 11:58

## 2024-01-01 RX ADMIN — Medication 5 MILLIGRAM(S): at 18:10

## 2024-01-01 RX ADMIN — HEPARIN SODIUM 5000 UNIT(S): 5000 INJECTION INTRAVENOUS; SUBCUTANEOUS at 05:57

## 2024-01-01 RX ADMIN — Medication 1: at 12:23

## 2024-01-01 RX ADMIN — SERTRALINE 100 MILLIGRAM(S): 25 TABLET, FILM COATED ORAL at 05:57

## 2024-01-01 RX ADMIN — Medication 1000 UNIT(S): at 11:58

## 2024-01-01 RX ADMIN — CLOPIDOGREL BISULFATE 75 MILLIGRAM(S): 75 TABLET, FILM COATED ORAL at 11:58

## 2024-01-01 RX ADMIN — FAMOTIDINE 20 MILLIGRAM(S): 10 INJECTION INTRAVENOUS at 12:11

## 2024-01-01 RX ADMIN — Medication 5 MILLIGRAM(S): at 17:28

## 2024-01-01 RX ADMIN — CLOPIDOGREL BISULFATE 75 MILLIGRAM(S): 75 TABLET, FILM COATED ORAL at 12:39

## 2024-01-01 RX ADMIN — Medication 2: at 00:25

## 2024-01-01 RX ADMIN — SERTRALINE 100 MILLIGRAM(S): 25 TABLET, FILM COATED ORAL at 05:43

## 2024-01-01 RX ADMIN — HEPARIN SODIUM 5000 UNIT(S): 5000 INJECTION INTRAVENOUS; SUBCUTANEOUS at 17:59

## 2024-01-01 RX ADMIN — AMLODIPINE BESYLATE 2.5 MILLIGRAM(S): 2.5 TABLET ORAL at 08:15

## 2024-01-01 RX ADMIN — FAMOTIDINE 20 MILLIGRAM(S): 10 INJECTION INTRAVENOUS at 11:58

## 2024-01-01 RX ADMIN — QUETIAPINE FUMARATE 50 MILLIGRAM(S): 200 TABLET, FILM COATED ORAL at 12:13

## 2024-01-01 RX ADMIN — Medication 5 MILLIGRAM(S): at 06:00

## 2024-01-01 RX ADMIN — Medication 1000 UNIT(S): at 14:42

## 2024-01-01 RX ADMIN — HEPARIN SODIUM 5000 UNIT(S): 5000 INJECTION INTRAVENOUS; SUBCUTANEOUS at 17:40

## 2024-01-01 RX ADMIN — QUETIAPINE FUMARATE 50 MILLIGRAM(S): 200 TABLET, FILM COATED ORAL at 21:32

## 2024-01-01 RX ADMIN — FAMOTIDINE 20 MILLIGRAM(S): 10 INJECTION INTRAVENOUS at 12:04

## 2024-01-01 RX ADMIN — Medication 2: at 00:49

## 2024-01-01 RX ADMIN — QUETIAPINE FUMARATE 50 MILLIGRAM(S): 200 TABLET, FILM COATED ORAL at 21:59

## 2024-01-01 RX ADMIN — SERTRALINE 100 MILLIGRAM(S): 25 TABLET, FILM COATED ORAL at 05:56

## 2024-01-01 RX ADMIN — AMLODIPINE BESYLATE 5 MILLIGRAM(S): 2.5 TABLET ORAL at 06:22

## 2024-01-01 RX ADMIN — Medication 5 MILLIGRAM(S): at 17:59

## 2024-01-01 RX ADMIN — SERTRALINE 100 MILLIGRAM(S): 25 TABLET, FILM COATED ORAL at 18:10

## 2024-01-01 RX ADMIN — CLOPIDOGREL BISULFATE 75 MILLIGRAM(S): 75 TABLET, FILM COATED ORAL at 14:43

## 2024-01-01 RX ADMIN — ERYTHROPOIETIN 10000 UNIT(S): 10000 INJECTION, SOLUTION INTRAVENOUS; SUBCUTANEOUS at 11:59

## 2024-01-01 RX ADMIN — SERTRALINE 100 MILLIGRAM(S): 25 TABLET, FILM COATED ORAL at 06:00

## 2024-01-01 RX ADMIN — LACTULOSE 20 GRAM(S): 10 SOLUTION ORAL at 17:40

## 2024-01-01 RX ADMIN — LACTULOSE 20 GRAM(S): 10 SOLUTION ORAL at 13:06

## 2024-01-01 RX ADMIN — Medication 650 MILLIGRAM(S): at 14:42

## 2024-01-01 RX ADMIN — Medication 5 MILLIGRAM(S): at 05:55

## 2024-01-01 RX ADMIN — Medication 5 MILLIGRAM(S): at 06:22

## 2024-01-01 RX ADMIN — Medication 2: at 11:57

## 2024-01-01 RX ADMIN — HEPARIN SODIUM 5000 UNIT(S): 5000 INJECTION INTRAVENOUS; SUBCUTANEOUS at 18:11

## 2024-01-01 RX ADMIN — Medication 5 MILLIGRAM(S): at 18:22

## 2024-01-01 RX ADMIN — SENNA PLUS 2 TABLET(S): 8.6 TABLET ORAL at 22:08

## 2024-01-01 RX ADMIN — SENNA PLUS 2 TABLET(S): 8.6 TABLET ORAL at 21:22

## 2024-01-01 RX ADMIN — Medication 133 MILLILITER(S): at 13:06

## 2024-02-23 NOTE — H&P ADULT - ASSESSMENT
79 y/o F with PMHx CVA (residual L-sided weakness), ESRD (MWF), HTN, Type 2 DM (on insulin), dysphagia (PEG tube), dementia sent from dialysis center to ED for syncope. Admitted for syncope.  77 y/o F with PMHx CVA (residual L-sided weakness, on Plavix), ESRD (MWF), HTN, Type 2 DM (on insulin), dysphagia (PEG tube), dementia sent from dialysis center to ED for syncope. Admitted for syncope.

## 2024-02-23 NOTE — ED ADULT NURSE REASSESSMENT NOTE - NS ED NURSE REASSESS COMMENT FT1
report received from previous ED RN. pt resting in stretcher in NAD, A&OX2, RR even and unlabored. hx of dementia, pt able to speak in clear complete sentences, tolerating baseline 2L NC well at this time. maintained on cardiac monitor, EKG completed. multiple attempts for iv access unsuccessful to right arm, left arm pink banded d/t dialysis. 20G iv placed to right leg, blood drawn and sent to lab. iv flushing well without complications, iv site WNL. pt argumentative but able to be redirected verbally, calm and cooperative at this time. safety measures maintained, side rails up x2. son at bedside.

## 2024-02-23 NOTE — H&P ADULT - HISTORY OF PRESENT ILLNESS
79 y/o F with PMHx CVA (residual L-sided weakness), ESRD (MWF), HTN, Type 2 DM (on insulin), dysphagia (PEG tube), dementia sent from dialysis center to ED for syncope. Patient completed HD session today during which 3L of fluid were removed, per patient. She arose from the chair, sat back down, then passed out. She is unsure how long she was unconscious for, but awoke to EMS around her in the center. Denied any prodromal symptoms. No seizure-like activity reported while unconscious. Currently, feels tired, but otherwise denies CP, SOB, headache, dizziness, palpitations Denies recent illness. Some household members are sick currently with URI symptoms. She denies fever, cough, sore throat.     ED course  Vitals: 98.1 F , HR 68, BP 99/63 --> 127/70, RR 18, SpO2 95% 2  Labs: Hgb 10.1, CO2 36, BUN 24  EKG: pending    CXR: elevated L hemidiaphragm, but no other cardiopulmonary disease (personal read)   79 y/o F with PMHx CVA (residual L-sided weakness, on Plavix), ESRD (MWF), HTN, Type 2 DM (on insulin), dysphagia (PEG tube), dementia sent from dialysis center to ED for syncope. Patient completed HD session today during which 3L of fluid were removed, per patient. She arose from the chair, sat back down, then passed out. She is unsure how long she was unconscious for, but awoke to EMS around her in the center. Denied any prodromal symptoms. No seizure-like activity reported while unconscious. Currently, feels tired, but otherwise denies CP, SOB, headache, dizziness, palpitations Denies recent illness. Some household members are sick currently with URI symptoms. She denies fever, cough, sore throat.     ED course  Vitals: 98.1 F , HR 68, BP 99/63 --> 127/70, RR 18, SpO2 95% 2  Labs: Hgb 10.1, CO2 36, BUN 24  EKG: pending    CXR: elevated L hemidiaphragm, but no other cardiopulmonary disease (personal read)   77 y/o F with PMHx CVA (residual L-sided weakness, on Plavix), ESRD (MWF), HTN, Type 2 DM (on insulin), dysphagia (PEG tube), dementia sent from dialysis center to ED for syncope. Patient completed HD session today during which 3L of fluid were removed, per patient. As per the , patient passed out while in the reclining chair. Patient is unsure how long she was unconscious for, but awoke to EMS around her in the center. This has only happened once or twice before after HD. Denied any prodromal symptoms. No seizure-like activity reported while unconscious. Currently, feels tired, but otherwise denies CP, SOB, headache, dizziness, palpitations Denies recent illness. Some household members are sick currently with URI symptoms. She denies fever, cough, sore throat.     ED course  Vitals: 98.1 F , HR 68, BP 99/63 --> 127/70, RR 18, SpO2 95% 2  Labs: Hgb 10.1, CO2 36, BUN 24  EKG: pending    CXR: elevated L hemidiaphragm, but no other cardiopulmonary disease (personal read)   77 y/o F with PMHx CVA (residual L-sided weakness, on Plavix), ESRD (MWF), HTN, Type 2 DM (on insulin), dysphagia (PEG tube), dementia sent from dialysis center to ED for syncope. Patient completed HD session today during which 3L of fluid were removed, per patient. As per the , patient passed out while in the reclining chair. Patient is unsure how long she was unconscious for, but awoke to EMS around her in the center. This has only happened once or twice before after HD. Denied any prodromal symptoms. No seizure-like activity reported while unconscious. Currently, feels tired, but otherwise denies CP, SOB, headache, dizziness, palpitations Denies recent illness. Some household members are sick currently with URI symptoms. She denies fever, cough, sore throat.     ED course  Vitals: 98.1 F , HR 68, BP 99/63 --> 127/70, RR 18, SpO2 95% 2  Labs: Hgb 10.1, CO2 36, BUN 24  CXR: elevated L hemidiaphragm, but no other cardiopulmonary disease (personal read)

## 2024-02-23 NOTE — H&P ADULT - NSHPSOCIALHISTORY_GEN_ALL_CORE
Denies tobacco, alcohol, recreational drugs  Lives: at home with family  ADLs: partially dependent, has 24hrs HHA  Ambulation: wheelchair-bound

## 2024-02-23 NOTE — H&P ADULT - NSHPPHYSICALEXAM_GEN_ALL_CORE
T(C): 36.5 (02-23-24 @ 19:19), Max: 36.7 (02-23-24 @ 18:19)  HR: 63 (02-23-24 @ 19:19) (63 - 68)  BP: 127/70 (02-23-24 @ 19:19) (99/63 - 127/70)  RR: 19 (02-23-24 @ 19:19) (18 - 19)  SpO2: 97% (02-23-24 @ 19:19) (95% - 97%)    GENERAL: patient appears chronically ill at baseline, no acute distress, appropriate, pleasant  EYES: sclera clear, no exudates  ENMT: +raspy voice chronically, +upper airway congestion, oropharynx clear without erythema, no exudates, moist mucous membranes  NECK: supple, soft, no thyromegaly noted  LUNGS: good air entry bilaterally, clear to auscultation, symmetric breath sounds, no wheezing or rhonchi appreciated  HEART: soft S1/S2, regular rate and rhythm, no murmurs noted, no lower extremity edema  GASTROINTESTINAL: +PEG tube, abdomen is soft, nontender, nondistended, normoactive bowel sounds, no palpable masses  INTEGUMENT: +mild erythema of anterior L ankle distal to IV site, no warmth or pain  MUSCULOSKELETAL: +Dupuytren's contractures of both hands  NEUROLOGIC: awake, alert, oriented x3,  +weakness of her left arm and left leg.  PSYCHIATRIC: mood is good, affect is congruent, linear and logical thought process  HEME/LYMPH: no palpable supraclavicular nodules, no obvious ecchymosis or petechiae T(C): 36.5 (02-23-24 @ 19:19), Max: 36.7 (02-23-24 @ 18:19)  HR: 63 (02-23-24 @ 19:19) (63 - 68)  BP: 127/70 (02-23-24 @ 19:19) (99/63 - 127/70)  RR: 19 (02-23-24 @ 19:19) (18 - 19)  SpO2: 97% (02-23-24 @ 19:19) (95% - 97%)  GENERAL: patient appears chronically ill at baseline, no acute distress, appropriate, pleasant  EYES: sclera clear, no exudates  ENMT: +raspy voice chronically, +upper airway congestion, oropharynx clear without erythema, no exudates, moist mucous membranes  NECK: supple, soft, no thyromegaly noted  LUNGS: good air entry bilaterally, clear to auscultation, symmetric breath sounds, no wheezing or rhonchi appreciated  HEART: soft S1/S2, regular rate and rhythm, no murmurs noted, no lower extremity edema  GASTROINTESTINAL: +PEG tube, abdomen is soft, nontender, nondistended, normoactive bowel sounds, no palpable masses  INTEGUMENT: +mild erythema of anterior L ankle distal to IV site, no warmth or pain  MUSCULOSKELETAL: +Dupuytren's contractures of both hands  NEUROLOGIC: awake, alert, oriented x3,  +weakness of her left arm and left leg.  PSYCHIATRIC: mood is good, affect is congruent, linear and logical thought process  HEME/LYMPH: no palpable supraclavicular nodules, no obvious ecchymosis or petechiae

## 2024-02-23 NOTE — ED PROVIDER NOTE - ATTENDING APP SHARED VISIT CONTRIBUTION OF CARE
Patient is an 78-year-old female with a history of a stroke with left-sided weakness, and dysphagia uses a  feeding tube, HTN, dementia, ESRD on HD M/W/F.  Underwent hemodialysis today.  And after dialysis she stood up became lightheaded and had brief syncopal episode.  She lives at home with her  and 24-hour aide.  Brought in by ambulance for evaluation.  Patient denies any significant trauma or injury.  She has baseline weakness on the left side.  No reports of cough fever or chills.  The son is at the bedside who provided the majority of the history.  Patient states she was never smoker.    On evaluation is a obese female awake alert and oriented to self and location.  In no apparent distress.  She speaks with a throaty voice consistent with dysphagia.  Her lungs are clear to auscultation.  Cardiac is regular rate and rhythm.  She has good peripheral pulses.  Abdomen is obese soft with a feeding tube the site appears to be without infection or bleeding.  Musculoskeletal exam patient has Dupuytren's contractures of both hands, weakness of her left arm and left leg.  Neurologic exam is as noted previously.  Without significant change at this time.  This was confirmed by the son at the bedside.  Who states she otherwise appears well.  There is no noted skin breakdown.    Plan of care this is a 78-year-old female with a syncopal episode of unknown provocation.  Possibly due to fluid shifting, electrolyte imbalance, cardiac arrhythmia, infection, TIA or CVA given her prior stroke history.  Will obtain CT imaging of the brain, electrolytes, cardiac monitor, EKG, blood glucose testing patient would likely need to be admitted to the hospital for continued cardiac monitoring cardiac evaluation neurology evaluation.  As she is not safe to go home as she is unable to stand.  This was discussed with the son at the bedside who agrees with the plan.  This chart was made with dictation software and may contain typographical errors.

## 2024-02-23 NOTE — ED ADULT NURSE NOTE - NSFALLHARMRISKINTERV_ED_ALL_ED

## 2024-02-23 NOTE — ED PROVIDER NOTE - NSICDXPASTMEDICALHX_GEN_ALL_CORE_FT
With thrombocytopenia and anemia  Follows with oncology outpt PAST MEDICAL HISTORY:  CVA (cerebrovascular accident)     Dementia     ESRD (end stage renal disease) on dialysis     Hypertension

## 2024-02-23 NOTE — H&P ADULT - PROBLEM SELECTOR PLAN 1
Syncope post-HD today, unknown downtime. Now back to baseline mentation. Likely 2/2 hypovolemia   - Pt reports 3L fluid removed today  - Mildly hypotensive on admission, BP improved without intervention  - f/u EKG  - Telemetry monitoring to r/o arrhythmia Syncope post-HD today, unknown downtime. Now back to baseline mentation. Likely 2/2 hypovolemia   - H/o CVA, on Plavix  - No concern for TIA/CVA at this time. Cont Plavix. Consider imaging if mental status changes  - Pt reports 3L fluid removed today  - Mildly hypotensive on admission, BP improved without intervention  - f/u EKG  - Telemetry monitoring to r/o arrhythmia Syncope post-HD today, unknown downtime. Now back to baseline mentation. Likely vasovagal vs. hypovolemic 2/2 HD  - H/o CVA, on Plavix  - No concern for TIA/CVA at this time. Cont Plavix. Consider imaging if mental status changes  - Pt reports 3L fluid removed today  - Mildly hypotensive on admission, BP improved without intervention  - f/u EKG  - Telemetry monitoring to r/o arrhythmia

## 2024-02-23 NOTE — ED PROVIDER NOTE - PHYSICAL EXAMINATION
Constitutional: Awake, Alert, non-toxic. NAD  HEAD: Normocephalic, atraumatic.   EYES: EOM intact, conjunctiva and sclera are clear bilaterally.   ENT: No rhinorrhea, patent, mucous membranes pink/moist, no drooling or stridor.   NECK: Supple, non-tender  CARDIOVASCULAR: Normal S1, S2; regular rate and rhythm.  RESPIRATORY: Normal respiratory effort; breath sounds CTAB, no wheezes, rhonchi, or rales. Speaking in full sentences. No accessory muscle use.   ABDOMEN: Soft; non-tender, non-distended.   EXTREMITIES: Full passive and active ROM in all extremities; non-tender to palpation; distal pulses palpable and symmetric, (+) left arm fistula   SKIN: Warm, dry; good skin turgor, no apparent lesions or rashes, no ecchymosis, brisk capillary refill.  NEURO: A&O x3. Sensory and motor functions are grossly intact. Speech is normal. Appearance and judgement seem appropriate for gender and age.

## 2024-02-23 NOTE — H&P ADULT - ATTENDING COMMENTS
79 y/o F with PMHx CVA (residual L-sided weakness, on Plavix), ESRD (MWF), HTN, Type 2 DM (on insulin), dysphagia (PEG tube), dementia sent from dialysis center to ED for syncope. Admitted for syncope. 77 y/o F with PMHx CVA (residual L-sided weakness, on Plavix), ESRD (MWF), HTN, Type 2 DM (on insulin), dysphagia (PEG tube), dementia sent from dialysis center to ED for syncope. Admitted for syncope.    Agree with above. Edited where appropriate

## 2024-02-23 NOTE — H&P ADULT - PROBLEM SELECTOR PLAN 3
On home insulin sliding scale  - A1C: pending  - Low ISS  - Regular finger sticks  - Tube Feeds  - Hypoglycemic protocol. On home insulin sliding scale  - A1C: pending  - Low ISS  - Regular finger sticks  - Cont Tube Feeds. Nutrition consult   - Hypoglycemic protocol.
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
## 2024-02-23 NOTE — ED PROVIDER NOTE - OBJECTIVE STATEMENT
28-year-old female with past medical history of dementia, CVA, end-stage renal disease with dialysis Monday Wednesday Friday, and hypertension presents today due to syncopal event.  Patient reports that she was getting dialysis today in which they completed a and she got up sat back down in a chair and then passed out.  Patient reports feeling well currently.  Patient denies chest pain, shortness of breath, headache, head injury, numbness, weakness, palpitations, or any other complaints. 78-year-old female with past medical history of dementia, CVA, end-stage renal disease with dialysis Monday Wednesday Friday, and hypertension presents today due to syncopal event.  Patient reports that she was getting dialysis today in which they completed a and she got up sat back down in a chair and then passed out.  Patient reports feeling well currently.  Patient denies chest pain, shortness of breath, headache, head injury, numbness, weakness, palpitations, or any other complaints.

## 2024-02-23 NOTE — H&P ADULT - PROBLEM SELECTOR PLAN 2
MWF dialysis schedule  - [facility]  - SW/CM to re-establish dialysis schedule MWF dialysis schedule  - [facility]  - SW/CM to re-establish dialysis schedule  - Nephbeata (Dr. Kong) consulted MWF dialysis schedule  - [facility]  - SW/CM consulted to re-establish dialysis schedule and coordinate with 24hr HHA  - Nephro (Dr. Kong) consulted

## 2024-02-23 NOTE — H&P ADULT - NSHPREVIEWOFSYSTEMS_GEN_ALL_CORE
CONSTITUTIONAL: + fatigue, denies fever, chills, weakness  HEENT: denies blurred vision, sore throat  SKIN: denies new lesions, rash  CARDIOVASCULAR: denies chest pain, chest pressure, palpitations  RESPIRATORY: denies shortness of breath, sputum production  GASTROINTESTINAL: denies nausea, vomiting, diarrhea, abdominal pain  GENITOURINARY: denies dysuria, discharge  NEUROLOGICAL: denies numbness, headache, focal weakness  MUSCULOSKELETAL: denies new joint pain, muscle aches  HEMATOLOGIC: denies gross bleeding, bruising  LYMPHATICS: denies enlarged lymph nodes, extremity swelling  PSYCHIATRIC: denies recent changes in anxiety, depression  ENDOCRINOLOGIC: denies sweating, cold or heat intolerance

## 2024-02-24 PROBLEM — F03.90 UNSPECIFIED DEMENTIA, UNSPECIFIED SEVERITY, WITHOUT BEHAVIORAL DISTURBANCE, PSYCHOTIC DISTURBANCE, MOOD DISTURBANCE, AND ANXIETY: Chronic | Status: ACTIVE | Noted: 2023-01-01

## 2024-02-24 NOTE — CONSULT NOTE ADULT - SUBJECTIVE AND OBJECTIVE BOX
Patient is a 78y old  Female who presents with a chief complaint of syncope (2024 08:14)       HPI:  79 y/o F with PMHx CVA (residual L-sided weakness, on Plavix), ESRD (MWF), HTN, Type 2 DM (on insulin), dysphagia (PEG tube), dementia sent from dialysis center to ED for syncope. Patient completed HD session today during which 3L of fluid were removed, per patient. As per the , patient passed out while in the reclining chair. Patient is unsure how long she was unconscious for, but awoke to EMS around her in the center. This has only happened once or twice before after HD. Denied any prodromal symptoms. No seizure-like activity reported while unconscious. Currently, feels tired, but otherwise denies CP, SOB, headache, dizziness, palpitations Denies recent illness. Some household members are sick currently with URI symptoms. She denies fever, cough, sore throat.     Renal consulted for ESRD. HD on MWF at [facility] dialysis with Dr. Prather    ED course  Vitals: 98.1 F , HR 68, BP 99/63 --> 127/70, RR 18, SpO2 95% 2  Labs: Hgb 10.1, CO2 36, BUN 24  CXR: elevated L hemidiaphragm, but no other cardiopulmonary disease (personal read)   (2024 23:34)       PAST MEDICAL & SURGICAL HISTORY:  ESRD (end stage renal disease) on dialysis      Hypertension      CVA (cerebrovascular accident)      Dementia      Feeding by G-tube      H/O Spinal surgery      History of appendectomy           FAMILY HISTORY:  FHx: breast cancer (Sibling)    NC    Social History:Non smoker    MEDICATIONS  (STANDING):  amLODIPine   Tablet 2.5 milliGRAM(s) Oral daily  cholecalciferol 1000 Unit(s) Oral daily  clopidogrel Tablet 75 milliGRAM(s) Enteral Tube daily  dextrose 5%. 1000 milliLiter(s) (50 mL/Hr) IV Continuous <Continuous>  dextrose 5%. 1000 milliLiter(s) (100 mL/Hr) IV Continuous <Continuous>  dextrose 50% Injectable 25 Gram(s) IV Push once  dextrose 50% Injectable 12.5 Gram(s) IV Push once  dextrose 50% Injectable 25 Gram(s) IV Push once  diazepam    Tablet 5 milliGRAM(s) Oral two times a day  famotidine    Tablet 20 milliGRAM(s) Oral daily  glucagon  Injectable 1 milliGRAM(s) IntraMuscular once  heparin   Injectable 5000 Unit(s) SubCutaneous every 12 hours  insulin lispro (ADMELOG) corrective regimen sliding scale   SubCutaneous every 6 hours  QUEtiapine 50 milliGRAM(s) Oral daily  sertraline 100 milliGRAM(s) Oral two times a day    MEDICATIONS  (PRN):  acetaminophen     Tablet .. 650 milliGRAM(s) Oral every 6 hours PRN Temp greater or equal to 38C (100.4F), Mild Pain (1 - 3)  aluminum hydroxide/magnesium hydroxide/simethicone Suspension 30 milliLiter(s) Oral every 4 hours PRN Dyspepsia  dextrose Oral Gel 15 Gram(s) Oral once PRN Blood Glucose LESS THAN 70 milliGRAM(s)/deciliter  melatonin 3 milliGRAM(s) Oral at bedtime PRN Insomnia  ondansetron Injectable 4 milliGRAM(s) IV Push every 8 hours PRN Nausea and/or Vomiting   Meds reviewed    Allergies    latex (Unknown)  NIFEdipine (Unknown)  Cherries (Short breath; Hives)  sulfa drugs (Hives; Rash)    Intolerances         REVIEW OF SYSTEMS: As per HPI, otherwise negative     Vital Signs Last 24 Hrs  T(C): 36.6 (2024 04:53), Max: 36.7 (2024 18:19)  T(F): 97.8 (2024 04:53), Max: 98.1 (2024 18:19)  HR: 72 (2024 04:53) (63 - 72)  BP: 168/83 (2024 04:53) (99/63 - 169/69)  BP(mean): --  RR: 18 (2024 04:53) (18 - 19)  SpO2: 99% (2024 04:53) (95% - 99%)    Parameters below as of 2024 04:53  Patient On (Oxygen Delivery Method): nasal cannula      Daily Height in cm: 162.56 (2024 18:19)    Daily Weight in k.2 (2024 04:53)    PHYSICAL EXAM:    GENERAL: NAD  HEAD:  Atraumatic, Normocephalic  EYES: EOMI, conjunctiva and sclera clear  ENMT: No Drainage from nares, No drainage from ears  NECK: Supple, neck  veins full  NERVOUS SYSTEM:  Awake and Alert  CHEST/LUNG: Clear to percussion bilaterally; No rales, rhonchi, wheezing, or rubs  HEART: Regular rate and rhythm; No murmurs, rubs, or gallops  ABDOMEN: Soft, Nontender, Nondistended; Bowel sounds present  EXTREMITIES:  No Edema  SKIN: No rashes No obvious ecchymosis      LABS:                        10.7   8.57  )-----------( 151      ( 2024 06:00 )             33.7         141  |  97  |  28<H>  ----------------------------<  109<H>  3.5   |  36<H>  |  1.40<H>    Ca    9.5      2024 06:00    TPro  6.8  /  Alb  3.6  /  TBili  0.4  /  DBili  x   /  AST  10<L>  /  ALT  14  /  AlkPhos  72      PT/INR - ( 2024 22:10 )   PT: 10.7 sec;   INR: 0.91 ratio         PTT - ( 2024 06:00 )  PTT:30.4 sec  Urinalysis Basic - ( 2024 06:00 )    Color: x / Appearance: x / SG: x / pH: x  Gluc: 109 mg/dL / Ketone: x  / Bili: x / Urobili: x   Blood: x / Protein: x / Nitrite: x   Leuk Esterase: x / RBC: x / WBC x   Sq Epi: x / Non Sq Epi: x / Bacteria: x              RADIOLOGY & ADDITIONAL TESTS:

## 2024-02-24 NOTE — DISCHARGE NOTE PROVIDER - ATTENDING DISCHARGE PHYSICAL EXAMINATION:
GENERAL: in NAD  HEENT: EOMI, hearing normal, conjunctiva and sclera clear, chronic raspy voice  Chest: CTA bilaterally, no wheezing  CV: S1S2, RRR  GI: soft, +BS, NT/ND, +PEG  Musculoskeletal: no LE edema  Psychiatric: normal affect  Neuro: confused, moves extremities  Skin: warm and dry

## 2024-02-24 NOTE — DISCHARGE NOTE PROVIDER - NSDCFUADDINST_GEN_ALL_CORE_FT
Follow up with your PCP and Nephrologist upon discharge from the hospital. Follow up with your PCP and Nephrologist upon discharge from the hospital.  Stop taking Amlodipine 2.5 mg and start taking Amlodipine 5 mg.

## 2024-02-24 NOTE — ED ADULT NURSE REASSESSMENT NOTE - NS ED NURSE REASSESS COMMENT FT1
pt resting in stretcher in NAD, RR even and unlabored. maintained on cardiac monitor, NSR at 65 bpm noted. pt A&OX2, able to speak in clear complete sentences. safety measures maintained, side rails up x2. awaiting admitting bed placement

## 2024-02-24 NOTE — PATIENT PROFILE ADULT - FALL HARM RISK - RISK INTERVENTIONS

## 2024-02-24 NOTE — DISCHARGE NOTE PROVIDER - INSTRUCTIONS
Diet in hospital:  Diet, NPO with Tube Feed:   Tube Feeding Modality: Gastrostomy  Nepro with Carb Steady  Total Volume for 24 Hours (mL): 960  Bolus  Total Volume of Bolus (mL):  240  Total # of Feeds: 4  Tube Feed Frequency: Every 6 hours   Tube Feed Start Time: 00:00  Bolus Feed Rate (mL per Hour): 1   Bolus Feed Duration (in Hours): 1  Bolus Feed Instructions:  provide bolus @ 8a, 12p , 4p , 8 p     Please continue home tube feeding regimen upon discharge.

## 2024-02-24 NOTE — DISCHARGE NOTE PROVIDER - NSDCMRMEDTOKEN_GEN_ALL_CORE_FT
amLODIPine 2.5 mg oral tablet: 1 tab(s) orally once a day as needed for SBP &gt;120  clopidogrel 75 mg oral tablet: 1 tab(s) orally once a day  diazePAM 5 mg oral tablet: 1 tab(s) orally 2 times a day  famotidine 20 mg oral tablet: 1 tab(s) orally once a day  QUEtiapine 50 mg oral tablet: 1 tab(s) orally once a day  sertraline 100 mg oral tablet: 1 tab(s) orally 2 times a day  Vitamin D3 25 mcg (1000 intl units) oral tablet: 1 tab(s) orally once a day   amLODIPine 5 mg oral tablet: 1 tab(s) orally once a day  clopidogrel 75 mg oral tablet: 1 tab(s) orally once a day  diazePAM 5 mg oral tablet: 1 tab(s) orally 2 times a day  famotidine 20 mg oral tablet: 1 tab(s) orally once a day  QUEtiapine 50 mg oral tablet: 1 tab(s) orally once a day  sertraline 100 mg oral tablet: 1 tab(s) orally 2 times a day  Vitamin D3 25 mcg (1000 intl units) oral tablet: 1 tab(s) orally once a day   amLODIPine 5 mg oral tablet: 1 tab(s) orally once a day  clopidogrel 75 mg oral tablet: 1 tab(s) orally once a day  diazePAM 5 mg oral tablet: 1 tab(s) orally 2 times a day  famotidine 20 mg oral tablet: 1 tab(s) orally once a day  hydrALAZINE 10 mg oral tablet: 1 tab(s) orally 3 times a day  QUEtiapine 50 mg oral tablet: 1 tab(s) orally once a day  sertraline 100 mg oral tablet: 1 tab(s) orally 2 times a day  Vitamin D3 25 mcg (1000 intl units) oral tablet: 1 tab(s) orally once a day

## 2024-02-24 NOTE — DISCHARGE NOTE PROVIDER - NSDCCPCAREPLAN_GEN_ALL_CORE_FT
PRINCIPAL DISCHARGE DIAGNOSIS  Diagnosis: Syncope  Assessment and Plan of Treatment: You were diagnosed with syncope after hemodialysis. Your blood pressure was low upon presentation, but improved with conservative treatment. You received hemodialysis while in the hospital and demonstrated clinical improvement.     PRINCIPAL DISCHARGE DIAGNOSIS  Diagnosis: Syncope  Assessment and Plan of Treatment: You were diagnosed with syncope after hemodialysis. Your blood pressure was low upon presentation, but improved with conservative treatment. You received hemodialysis while in the hospital and demonstrated clinical improvement. You were monitored on the telemetry floor during your stay and you did not have any acute findings while on the monitor. You underwent an echocardiogram of your heart which was found to be normal.     PRINCIPAL DISCHARGE DIAGNOSIS  Diagnosis: Syncope  Assessment and Plan of Treatment: You were diagnosed with syncope after hemodialysis. Your blood pressure was low upon presentation, but improved with conservative treatment. You received hemodialysis while in the hospital and demonstrated clinical improvement. You were monitored on the telemetry floor during your stay and you did not have any acute findings while on the monitor. You underwent an echocardiogram of your heart which did not have any concerning findings. The cause of your passing out was likely due to changes in blood pressure with hemodialysis as it seems your blood pressure is very sensitive to dialysis.      SECONDARY DISCHARGE DIAGNOSES  Diagnosis: ESRD on dialysis  Assessment and Plan of Treatment: Please follow up with your nephrologist to continue hemodialysis and discuss fluid removal and frequency. May need to reduce UF goal at dialysis unit. Appear to be below ideal EDW. Your nephrologist will manage this and plan accordingly.    Diagnosis: HTN (hypertension)  Assessment and Plan of Treatment: Your blood pressure is quite labile and has trended up very high, especially on non-dialysis days. We have changed your blood pressure regimen to amlodipine 5mg daily and added hydralazine 10mg three times a day. You have tolerated this regimen well in the hospital. Please continue to monitor your blood pressure and follow up with your PCP/cardiologist/nephrologist for further management.

## 2024-02-24 NOTE — DISCHARGE NOTE PROVIDER - CARE PROVIDER_API CALL
Rafi Mendez  Nephrology  [address]  [place] [address]  Phone: [phone]  Fax: [phone]  Follow Up Time:

## 2024-02-24 NOTE — PATIENT PROFILE ADULT - FUNCTIONAL ASSESSMENT - BASIC MOBILITY 6.
1-calculated by average/Not able to assess (calculate score using [facility] averaging method)

## 2024-02-24 NOTE — DISCHARGE NOTE PROVIDER - HOSPITAL COURSE
79 y/o F with PMHx CVA (residual L-sided weakness, on Plavix), ESRD (MWF), HTN, Type 2 DM (on insulin), dysphagia (PEG tube), dementia sent from dialysis center to ED for syncope. Patient completed HD session today during which 3L of fluid were removed, per patient. As per the , patient passed out while in the reclining chair. Patient is unsure how long she was unconscious for, but awoke to EMS around her in the center. This has only happened once or twice before after HD. Denied any prodromal symptoms. No seizure-like activity reported while unconscious. Currently, feels tired, but otherwise denies CP, SOB, headache, dizziness, palpitations Denies recent illness. Some household members are sick currently with URI symptoms. She denies fever, cough, sore throat.     ED course  Vitals: 98.1 F , HR 68, BP 99/63 --> 127/70, RR 18, SpO2 95% 2  Labs: Hgb 10.1, CO2 36, BUN 24  CXR:  Vascular congestive changes and small pleural effusions   greater on the left. Left retrocardiac opacity consistent with   pneumonia/atelectasis/effusion.     Pt. was admitted with Nephrology consultation.  Orthostatic BP from supine to sitting (patient has chronic weakness of bilateral LE and does not stand) were negative.  EChocardiogram showed __________.       HPI:  79 y/o F with PMHx CVA (residual L-sided weakness, on Plavix), ESRD (MWF), HTN, Type 2 DM (on insulin), dysphagia (PEG tube), dementia sent from dialysis center to ED for syncope. Patient completed HD session today during which 3L of fluid were removed, per patient. As per the , patient passed out while in the reclining chair. Patient is unsure how long she was unconscious for, but awoke to EMS around her in the center. This has only happened once or twice before after HD. Denied any prodromal symptoms. No seizure-like activity reported while unconscious. Currently, feels tired, but otherwise denies CP, SOB, headache, dizziness, palpitations Denies recent illness. Some household members are sick currently with URI symptoms. She denies fever, cough, sore throat.     ED course  Vitals: 98.1 F , HR 68, BP 99/63 --> 127/70, RR 18, SpO2 95% 2  Labs: Hgb 10.1, CO2 36, BUN 24  CXR: elevated L hemidiaphragm, but no other cardiopulmonary disease (personal read)   (23 Feb 2024 23:34)      ---  HOSPITAL COURSE: Patient admitted to medicine floor for management of syncope s/p HD. Patient was hypotensive on arrival to the ED which resolved spontaneously. The patient underwent TTE for evaluation of syncope. The patient received dialysis twice during her stay in the hospital, 2/26/24 and 2/28/24. Orthostatic blood pressures were obtained as well. Home medications were continued.    Pt seen and examined on day of discharge. Patient is medically optimized for discharge to home with close outpatient followup.    PHYSICAL EXAM ON DAY OF DISCHARGE:  The patient was seen and examined on the day of discharge. Please see progress note from day of discharge for further information.         ---  CONSULTANTS:   Nephrology: Augustine    ---  TIME SPENT:  I, the attending physician, was physically present for the key portions of the evaluation and management (E/M) service provided. The total amount of time spent reviewing the hospital notes, laboratory values, imaging findings, assessing/counseling the patient, discussing with consultant physicians, social work, nursing staff was -- minutes    ---  Primary care provider was made aware of plan for discharge:      [  ] NO     [  ] YES   HPI:  77 y/o F with PMHx CVA (residual L-sided weakness, on Plavix), ESRD (MWF), HTN, Type 2 DM (on insulin), dysphagia (PEG tube), dementia sent from dialysis center to ED for syncope. Patient completed HD session today during which 3L of fluid were removed, per patient. As per the , patient passed out while in the reclining chair. Patient is unsure how long she was unconscious for, but awoke to EMS around her in the center. This has only happened once or twice before after HD. Denied any prodromal symptoms. No seizure-like activity reported while unconscious. Currently, feels tired, but otherwise denies CP, SOB, headache, dizziness, palpitations Denies recent illness. Some household members are sick currently with URI symptoms. She denies fever, cough, sore throat.     ED course  Vitals: 98.1 F , HR 68, BP 99/63 --> 127/70, RR 18, SpO2 95% 2  Labs: Hgb 10.1, CO2 36, BUN 24  CXR: elevated L hemidiaphragm, but no other cardiopulmonary disease (personal read)   (23 Feb 2024 23:34)      ---  HOSPITAL COURSE: Patient admitted to medicine floor for management of syncope s/p HD. Patient was hypotensive on arrival to the ED which resolved spontaneously. The patient underwent TTE for evaluation of syncope which was shown to be normal. The patient was monitored on telemetry with no acute events. The patient received dialysis twice during her stay in the hospital, 2/26/24 and 2/28/24. Orthostatic blood pressures were obtained as well. Home medications were continued.    Pt seen and examined on day of discharge. Patient is medically optimized for discharge to home with close outpatient followup.    PHYSICAL EXAM ON DAY OF DISCHARGE:  The patient was seen and examined on the day of discharge. Please see progress note from day of discharge for further information.         ---  CONSULTANTS:   Nephrology: Augustine    ---  TIME SPENT:  I, the attending physician, was physically present for the key portions of the evaluation and management (E/M) service provided. The total amount of time spent reviewing the hospital notes, laboratory values, imaging findings, assessing/counseling the patient, discussing with consultant physicians, social work, nursing staff was -- minutes    ---  Primary care provider was made aware of plan for discharge:      [  ] NO     [  ] YES   HPI:  77 y/o F with PMHx CVA (residual L-sided weakness, on Plavix), ESRD (MWF), HTN, Type 2 DM (on insulin), dysphagia (PEG tube), dementia sent from dialysis center to ED for syncope. Patient completed HD session today during which 3L of fluid were removed, per patient. As per the , patient passed out while in the reclining chair. Patient is unsure how long she was unconscious for, but awoke to EMS around her in the center. This has only happened once or twice before after HD. Denied any prodromal symptoms. No seizure-like activity reported while unconscious. Currently, feels tired, but otherwise denies CP, SOB, headache, dizziness, palpitations Denies recent illness. Some household members are sick currently with URI symptoms. She denies fever, cough, sore throat.     ED course  Vitals: 98.1 F , HR 68, BP 99/63 --> 127/70, RR 18, SpO2 95% 2  Labs: Hgb 10.1, CO2 36, BUN 24  CXR: elevated L hemidiaphragm, but no other cardiopulmonary disease (personal read)   (23 Feb 2024 23:34)      ---  HOSPITAL COURSE: Patient admitted to telemetry floor for management and work up of syncope s/p HD. Patient was hypotensive on arrival to the ED which resolved spontaneously. The patient underwent TTE for evaluation of syncope which was shown to be normal. The patient was monitored on telemetry with no acute events. The patient received dialysis twice during her stay in the hospital, 2/26/24 and 2/28/24. Orthostatic blood pressures were obtained as well. BP medications adjusted due to labile BP readings - amlodipine increased to 5mg daily and hydralazine 10mg TID added. Patient was given retacrit for her chronic anemia secondary to renal disease. BP stable on new regimen and tolerated HD well without events. To follow up with outpatient MD for further management.    Pt seen and examined on day of discharge. Patient is medically optimized for discharge to home with close outpatient follow up.      ---  CONSULTANTS:   Nephrology: Augustine    ---   HPI:  79 y/o F with PMHx CVA (residual L-sided weakness, on Plavix), ESRD (MWF), HTN, Type 2 DM (on insulin), dysphagia (PEG tube), dementia sent from dialysis center to ED for syncope. Patient completed HD session today during which 3L of fluid were removed, per patient. As per the , patient passed out while in the reclining chair. Patient is unsure how long she was unconscious for, but awoke to EMS around her in the center. This has only happened once or twice before after HD. Denied any prodromal symptoms. No seizure-like activity reported while unconscious. Currently, feels tired, but otherwise denies CP, SOB, headache, dizziness, palpitations Denies recent illness. Some household members are sick currently with URI symptoms. She denies fever, cough, sore throat.     ED course  Vitals: 98.1 F , HR 68, BP 99/63 --> 127/70, RR 18, SpO2 95% 2  Labs: Hgb 10.1, CO2 36, BUN 24  CXR: elevated L hemidiaphragm, but no other cardiopulmonary disease (personal read)   (23 Feb 2024 23:34)      ---  HOSPITAL COURSE: Patient admitted to telemetry floor for management and work up of syncope s/p HD. Patient was hypotensive on arrival to the ED which resolved spontaneously. The patient underwent TTE for evaluation of syncope which was shown to be normal. The patient was monitored on telemetry with no acute events. The patient received dialysis twice during her stay in the hospital, 2/26/24 and 2/28/24. Orthostatic blood pressures were obtained as well. BP medications adjusted due to labile BP readings - amlodipine increased to 5mg daily and hydralazine 10mg TID added. Patient was given retacrit for her chronic anemia secondary to renal disease. BP stable on new regimen and tolerated HD well without events. To follow up with outpatient MD for further management.    Pt seen and examined on day of discharge. Patient is medically optimized for discharge to home with close outpatient follow up.    ---  PATIENT CONDITION:  - stable    ---  Vitals:  Vital Signs Last 24 Hrs  T(C): 36.6 (28 Feb 2024 10:50), Max: 36.6 (28 Feb 2024 04:45)  T(F): 97.9 (28 Feb 2024 10:50), Max: 97.9 (28 Feb 2024 10:50)  HR: 69 (28 Feb 2024 10:55) (63 - 75)  BP: 160/73 (28 Feb 2024 10:55) (151/81 - 165/76)  BP(mean): --  RR: 18 (28 Feb 2024 10:50) (17 - 18)  SpO2: 100% (28 Feb 2024 10:50) (98% - 100%)    Parameters below as of 28 Feb 2024 10:50  Patient On (Oxygen Delivery Method): nasal cannula  O2 Flow (L/min): 2      PHYSICAL EXAM ON DAY OF DISCHARGE:  GENERAL: NAD, sitting up in bed  HEENT:  anicteric, moist mucous membranes  CHEST/LUNG: CTA B/L. No rales, wheezes, or rhonchi  HEART:  RRR, S1, S2  ABDOMEN:  BS+, soft, nontender, nondistended  EXTREMITIES: no edema, cyanosis.   NERVOUS SYSTEM: answers questions and follows commands appropriately    CONSULTANTS:   Nephrology: Jered Bower    ---  TIME SPENT:  I, the attending physician, was physically present for the key portions of the evaluation and management (E/M) service provided. The total amount of time spent reviewing the hospital notes, laboratory values, imaging findings, assessing/counseling the patient, discussing with consultant physicians, social work, nursing staff was -- minutes

## 2024-02-24 NOTE — CONSULT NOTE ADULT - ASSESSMENT
ESRD  Syncope  HTN  Anemia  2HPTH  Dementia     -HD MWF. No indication for extra dialysis today  -Phos binders as needed  -Hgb goal 10-12  -Continue home BP medications if BP can tolerate  -Reduce UF goal at dialysis unit. Appear to be below idea EDW

## 2024-02-25 NOTE — DIETITIAN INITIAL EVALUATION ADULT - PERTINENT MEDS FT
MEDICATIONS  (STANDING):  amLODIPine   Tablet 2.5 milliGRAM(s) Oral daily  cholecalciferol 1000 Unit(s) Oral daily  clopidogrel Tablet 75 milliGRAM(s) Enteral Tube daily  dextrose 5%. 1000 milliLiter(s) (50 mL/Hr) IV Continuous <Continuous>  dextrose 5%. 1000 milliLiter(s) (100 mL/Hr) IV Continuous <Continuous>  dextrose 50% Injectable 25 Gram(s) IV Push once  dextrose 50% Injectable 12.5 Gram(s) IV Push once  dextrose 50% Injectable 25 Gram(s) IV Push once  diazepam    Tablet 5 milliGRAM(s) Oral two times a day  famotidine    Tablet 20 milliGRAM(s) Oral daily  glucagon  Injectable 1 milliGRAM(s) IntraMuscular once  heparin   Injectable 5000 Unit(s) SubCutaneous every 12 hours  insulin lispro (ADMELOG) corrective regimen sliding scale   SubCutaneous every 6 hours  QUEtiapine 50 milliGRAM(s) Oral daily  sertraline 100 milliGRAM(s) Oral two times a day    MEDICATIONS  (PRN):  acetaminophen     Tablet .. 650 milliGRAM(s) Oral every 6 hours PRN Temp greater or equal to 38C (100.4F), Mild Pain (1 - 3)  aluminum hydroxide/magnesium hydroxide/simethicone Suspension 30 milliLiter(s) Oral every 4 hours PRN Dyspepsia  dextrose Oral Gel 15 Gram(s) Oral once PRN Blood Glucose LESS THAN 70 milliGRAM(s)/deciliter  melatonin 3 milliGRAM(s) Oral at bedtime PRN Insomnia  ondansetron Injectable 4 milliGRAM(s) IV Push every 8 hours PRN Nausea and/or Vomiting

## 2024-02-25 NOTE — CARE COORDINATION ASSESSMENT. - MET/SPOKE WITH
Patient provided consent for CM to speak with her  Alvin; Alvin requesting CM speak with son Jono to gather information as he coordinates care./patient/son/spouse

## 2024-02-25 NOTE — DIETITIAN INITIAL EVALUATION ADULT - NSFNSGIIOFT_GEN_A_CORE
02-24-24 @ 07:01  -  02-25-24 @ 07:00  --------------------------------------------------------  OUT:  Total OUT: 0 mL    Total NET: 720 mL

## 2024-02-25 NOTE — PATIENT CHOICE NOTE. - NSPTCHOICESTATE_GEN_ALL_CORE

## 2024-02-25 NOTE — DIETITIAN INITIAL EVALUATION ADULT - ADD RECOMMEND
current TF plus water given for medication administration appears to be meeting fluid requirements  prosource TF 45 ml via PEG BID ( each 45 ml serv provides 40 kcals and 11 gm protein ) to help ensure adeq protein intake current TF plus water given for medication administration and before/after each bolus feed for tube patency  appears to be meeting fluid requirements  prosource TF 45 ml via PEG BID ( each 45 ml serv provides 40 kcals and 11 gm protein ) to help ensure adeq protein intake

## 2024-02-25 NOTE — DIETITIAN INITIAL EVALUATION ADULT - PERTINENT LABORATORY DATA
02-24    141  |  97  |  28<H>  ----------------------------<  109<H>  3.5   |  36<H>  |  1.40<H>    Ca    9.5      24 Feb 2024 06:00    TPro  6.8  /  Alb  3.6  /  TBili  0.4  /  DBili  x   /  AST  10<L>  /  ALT  14  /  AlkPhos  72  02-24  POCT Blood Glucose.: 143 mg/dL (02-25-24 @ 06:49)  A1C with Estimated Average Glucose Result: 5.4 % (02-24-24 @ 06:00)  A1C with Estimated Average Glucose Result: 5.8 % (10-06-23 @ 06:55)

## 2024-02-25 NOTE — CARE COORDINATION ASSESSMENT. - NSPASTMEDSURGHISTORY_GEN_ALL_CORE_FT
PAST MEDICAL & SURGICAL HISTORY:  Hypertension      ESRD (end stage renal disease) on dialysis      CVA (cerebrovascular accident)      Feeding by G-tube      Dementia      History of appendectomy      H/O Spinal surgery

## 2024-02-25 NOTE — CARE COORDINATION ASSESSMENT. - NSCAREPROVIDERS_GEN_ALL_CORE_FT
CARE PROVIDERS:  Accepting Physician: Iwona Gaxiola  Administration: Olu Wolfe  Admitting: Iwona Gaxiola  Attending: Iwona Gaxiola  Cardiology Technician: Rosetta Wagner  Consultant: Chema Bradshaw  Consultant: Timothy Chi  Covering Team: Andrei Hdz ED ACP: Slim Metz  ED Attending: Axel Vega  ED Nurse: Veronika Fitch  Nurse: Aliza Quinones  Nurse: Silva Diana  Nurse: Bobby Mckeon  Ordered: ADM, User  Ordered: Doctor, Unknown  Outpatient Provider: Aroldo Starkey  Outpatient Provider: Bertin Acevedo  Override: Anirudh Townsend  PCA/Nursing Assistant: Luanne Hermosillo  Primary Team: Zan Hinson  Primary Team: Iwona Gaxiola  Primary Team: Slim Mtez  Registered Dietitian: Divya Harris  Registered Dietitian: Carina Reese   CARE PROVIDERS:  Accepting Physician: Iwona Gaxiola  Administration: Olu Wolfe  Admitting: Iwona Gaxiola  Attending: Iwona Gaxiola  Cardiology Technician: Rosetta Wagner  Consultant: Chema Bradshaw  Consultant: Timothy Chi  Covering Team: Andrei Hdz ED ACP: Slim Metz  ED Attending: Axel Vega  ED Nurse: Veronika Fitch  Nurse: Aliza Quinones  Nurse: Silva Diana  Nurse: Bobby Mckeon  Ordered: ADM, User  Ordered: Doctor, Unknown  Outpatient Provider: Aroldo Starkey  Outpatient Provider: Bertin Acevedo  Override: Anirudh Townsend  Primary Team: Zan Hinson  Primary Team: Iwona Gaxiola  Primary Team: Slim Metz  Registered Dietitian: Divya Harris  Registered Dietitian: Carina Reese

## 2024-02-25 NOTE — DIETITIAN INITIAL EVALUATION ADULT - REASON FOR ADMISSION
per pt's sitter from facility gave us the direct number for a direct nurse to 
nurse update because supervisor is off. 

[phone] Syncope and collapse     [As Noted in HPI] : as noted in HPI [Negative] : Constitutional

## 2024-02-25 NOTE — CARE COORDINATION ASSESSMENT. - OTHER PERTINENT DISCHARGE PLANNING INFORMATION:
CM met with patient at bedside, introduced self and explained role of CM and transitional care planning, patient verbalized understanding. She requested CM speak with her  Alvin. CM spoke with Alvin to gather information, however he redirected CM to his son Jono who coordinates care. Per Jono, patient resides in private home with spouse with no LYNDON, has private hire aide 14-15hrs a day x 7 days a week. Patient is non-ambulatory/wheelchair bound. Private hire aides assists with all ADL's, including feeds through feeding tube. Feeding tube is managed by patient's PCP Nba Bonilla. Patient attends HD M-W-F at [facility], and is transported by spouse via handicap accessible van. She is currently receiving [facility] services via [facility] and son is requesting resumption upon DC. When DC confirmed, she will need ambulance arranged to transport home, family will be home to receive patient.  CM met with patient at bedside, introduced self and explained role of CM and transitional care planning, patient verbalized understanding. She requested CM speak with her  Alvin. CM spoke with Alvin to gather information, however he redirected CM to his son Jono who coordinates care. Per Jono, patient resides in private home with spouse with no LYNDON, has private hire aide 14-15hrs a day x 7 days a week. Patient is non-ambulatory/wheelchair bound. Private hire aides assists with all ADL's, including feeds through feeding tube. Feeding tube is managed by patient's PCP Nba Bonilla. Margaux is Dr. Mendez.  Patient attends HD M-W-F at [facility], and is transported by spouse via handicap accessible van. She is currently receiving [facility] services via [facility] and son is requesting resumption upon DC. When DC confirmed, she will need ambulance arranged to transport home, family will be home to receive patient.

## 2024-02-25 NOTE — DIETITIAN INITIAL EVALUATION ADULT - NSFNSPHYEXAMSKINFT_GEN_A_CORE
Pressure Injury 1: sacrum, Stage I  Pressure Injury 2: Bilateral:, heel, Suspected deep tissue injury  Pressure Injury 3: none, none  Pressure Injury 4: none, none  Pressure Injury 5: none, none  Pressure Injury 6: none, none  Pressure Injury 7: none, none  Pressure Injury 8: none, none  Pressure Injury 9: none, none  Pressure Injury 10: none, none  Pressure Injury 11: none, none, Pressure Injury 1: sacrum, Stage I  Pressure Injury 2: Bilateral:, heel, Suspected deep tissue injury  Pressure Injury 3: none, none  Pressure Injury 4: none, none  Pressure Injury 5: none, none  Pressure Injury 6: none, none  Pressure Injury 7: none, none  Pressure Injury 8: none, none  Pressure Injury 9: none, none  Pressure Injury 10: none, none  Pressure Injury 11: none, none Pressure Injury 1: sacrum, Stage I  Pressure Injury 2: Bilateral:, heel, Suspected deep tissue injury

## 2024-02-25 NOTE — DIETITIAN INITIAL EVALUATION ADULT - NSICDXPASTSURGICALHX_GEN_ALL_CORE_FT
PAST SURGICAL HISTORY:  Feeding by G-tube     H/O Spinal surgery     History of appendectomy     
Patient baseline mental status

## 2024-02-25 NOTE — DIETITIAN INITIAL EVALUATION ADULT - PROBLEM SELECTOR PLAN 1
Syncope post-HD today, unknown downtime. Now back to baseline mentation. Likely vasovagal vs. hypovolemic 2/2 HD  - H/o CVA, on Plavix  - No concern for TIA/CVA at this time. Cont Plavix. Consider imaging if mental status changes  - Pt reports 3L fluid removed today  - Mildly hypotensive on admission, BP improved without intervention  - f/u EKG  - Telemetry monitoring to r/o arrhythmia

## 2024-02-25 NOTE — CARE COORDINATION ASSESSMENT. - NS SW [facility] DISCIPLINE
Per son, patient was receiving services with [facility]/physical therapy/skilled nursing

## 2024-02-25 NOTE — DIETITIAN INITIAL EVALUATION ADULT - ENTER TO (CAL/KG)
Here with Jeffery today for routine prenatal visit at 32w4d. Presents following her ultrasound appointment. Advised on preliminary report; will receive results when finalized. Reports feeling well. Notes active FM and no regular UCs. Wondering about breastfeeding supports available. Reviewed birth plan information and encouraged to continue to bring questions. Discussed plan for circ and peds choice. Continued 3rd trimester teaching. Advised on upcoming labs and visit schedule. Reviewed warning s/sx and reasons to call. RTC 2 weeks.     30

## 2024-02-25 NOTE — DIETITIAN INITIAL EVALUATION ADULT - OTHER INFO
77 y/o F with PMHx CVA (residual L-sided weakness, on Plavix), ESRD (MWF), HTN, Type 2 DM (on insulin), dysphagia (PEG tube), dementia sent from dialysis center to ED for syncope. Admitted for syncope.        Problem/Plan - 1:  ·  Problem: Syncope.   ·  Plan: Syncope post-HD, unknown downtime. Now back to baseline mentation. Likely vasovagal vs. hypovolemic 2/2 HD  - H/o CVA, on Plavix  - No concern for TIA/CVA at this time. Cont Plavix. Consider imaging if mental status changes  - Pt reports 3L fluid removed today  - Mildly hypotensive on admission, BP improved without intervention   79 y/o F with PMHx CVA (residual L-sided weakness, on Plavix), ESRD (MWF), HTN, Type 2 DM (on insulin), dysphagia (PEG tube), dementia sent from dialysis center to ED for syncope. Admitted for syncope  post-HD, unknown downtime. Now back to baseline mentation. Likely vasovagal vs. hypovolemic 2/2 HD   No concern for TIA/CVA at this time. Mildly hypotensive on admission, BP improved without intervention.    At time of RD visit to pts bedside, private aide in attendance. Per pt and aide, tolerating bolus feeds but has not had a BM in a few days, tends to get constipated at home, Miralax usually works, nsg made aware and will inform MD. There is a chance pt might go home today

## 2024-02-25 NOTE — DIETITIAN INITIAL EVALUATION ADULT - PROBLEM SELECTOR PLAN 3
On home insulin sliding scale  - A1C: pending  - Low ISS  - Regular finger sticks  - Cont Tube Feeds. Nutrition consult   - Hypoglycemic protocol.

## 2024-02-25 NOTE — DIETITIAN INITIAL EVALUATION ADULT - PROBLEM SELECTOR PLAN 2
MWF dialysis schedule  - [facility]  - SW/CM consulted to re-establish dialysis schedule and coordinate with 24hr HHA  - Nephro (Dr. Kong) consulted

## 2024-02-25 NOTE — SOCIAL WORK PROGRESS NOTE - NSSWPROGRESSNOTE_GEN_ALL_CORE
Pt seen by lizy and cirilo today.Pt is a dialysis pt and goes to [facility] Mon Wed and friday however doesn't know  slot. Spouse takes her in their handicapped van. Cirilo will follow up with dialysis unit in the am regarding slot time and to obtain fax number to send  clinicals on dc.

## 2024-02-26 NOTE — CASE MANAGEMENT PROGRESS NOTE - NSCMPROGRESSNOTE_GEN_ALL_CORE
Discussed patient on rounds this morning and chart reviewed. Patient is currently on 2LNC. CM made contact with the patient's spouse Alvin who confirmed the patient has portable and stationary oxygen at home prior to admission that she uses as needed. [facility] has accepted case for [facility] when medically cleared. CM remains available.

## 2024-02-27 NOTE — SOCIAL WORK PROGRESS NOTE - NSSWPROGRESSNOTE_GEN_ALL_CORE
SW consulted pt having dialysis in the community. SW to reinstate dialysis when pt is medically stable for dc. SW also consulted for pt having 24 hour HHA's. If the 24 hour HHA come from a Medicaid agency, CM will reinstate aides to resume services. SANCHO will continue to follow.

## 2024-02-28 NOTE — CAREGIVER ENGAGEMENT NOTE - CAREGIVER OUTREACH NOTES - FREE TEXT
Alvin, patient's spouse, requested CM speak with his son Jono to coordinate discharge and gather information. Jono informed of anticipated DC home tomorrow. He is requesting resumption of [facility] services with [facility] for nursing and physical therapy. He will arrange resumption of private aides. Patient needs resumption of HD at [facility] in [place]. When DC ready, patient requires ambulance transport - patient is non-ambulatory. 
Per MD, patient is medically cleared for discharge home today. Referral sent to [facility] for resumption of services for SOC 2/29/24-case accepted. CM met with the patient in dialysis- patient oriented x 2/confused. Patient asking CM to contact her spouse or son. CM made contact with the patient's spouse Alvin [phone] and son Jono [phone] to discuss the transition plan for today. CM advised the patient's spouse and son that [facility] home care has accepted the patient for resumption of care. Patient's spouse requesting an ambulance for today at 5pm and stated he has resumed the private hire aide. Ambulance has been arranged for 5pm as requested. SW has faxed clinical documentation to outpatient hemodialysis. Patient's spouse and son verbalized understanding of the transition plan and are in agreement. IMM explained and reviewed with the patient's spouse over the phone. Bedside RN aware of plan. CM remains available.

## 2024-02-28 NOTE — DISCHARGE NOTE NURSING/CASE MANAGEMENT/SOCIAL WORK - PATIENT PORTAL LINK FT
You can access the FollowMyHealth Patient Portal offered by [facility] by registering at the following website: [email or web address]. By joining [facility]’s FollowMyHealth portal, you will also be able to view your health information using other applications (apps) compatible with our system.

## 2024-02-28 NOTE — PROGRESS NOTE ADULT - SUBJECTIVE AND OBJECTIVE BOX
CHIEF COMPLAINT/INTERVAL HISTORY:  Pt. seen and evaluated for syncope.  Pt. is in no distress.  Denies having any CP or SOB.       REVIEW OF SYSTEMS:  No fever, CP, SOB, or abdominal pain    Vital Signs Last 24 Hrs  T(C): 36.4 (27 Feb 2024 04:37), Max: 36.7 (26 Feb 2024 16:50)  T(F): 97.6 (27 Feb 2024 04:37), Max: 98 (26 Feb 2024 16:50)  HR: 59 (27 Feb 2024 04:37) (59 - 71)  BP: 155/- (27 Feb 2024 04:37) (118/61 - 199/71)  BP(mean): --  RR: 18 (27 Feb 2024 04:37) (18 - 18)  SpO2: 98% (27 Feb 2024 04:37) (95% - 100%)    Parameters below as of 27 Feb 2024 04:37  Patient On (Oxygen Delivery Method): nasal cannula        PHYSICAL EXAM:  GENERAL: NAD  HEENT: EOMI, hearing normal, conjunctiva and sclera clear, chronic raspy voice  Chest: CTA bilaterally, no wheezing  CV: S1S2, RRR,   GI: soft, +BS, NT/ND, +PEG  Musculoskeletal: no LE edema  Psychiatric: affect nL, mood nL  Skin: warm and dry    LABS:                        9.9    6.90  )-----------( 160      ( 26 Feb 2024 14:30 )             30.7     02-26    137  |  98  |  47<H>  ----------------------------<  140<H>  3.7   |  33<H>  |  1.70<H>    Ca    9.2      26 Feb 2024 14:30        Urinalysis Basic - ( 26 Feb 2024 14:30 )    Color: x / Appearance: x / SG: x / pH: x  Gluc: 140 mg/dL / Ketone: x  / Bili: x / Urobili: x   Blood: x / Protein: x / Nitrite: x   Leuk Esterase: x / RBC: x / WBC x   Sq Epi: x / Non Sq Epi: x / Bacteria: x      
CHIEF COMPLAINT/INTERVAL HISTORY:  Pt. seen and evaluated for syncope.  Pt. is in no distress.  Reports feeling tired but otherwise no new concerns.  Denies having CP or SOB.      REVIEW OF SYSTEMS:  No fever, CP, SOB, or abdominal pain    Vital Signs Last 24 Hrs  T(C): 36.3 (25 Feb 2024 04:52), Max: 36.7 (24 Feb 2024 12:20)  T(F): 97.3 (25 Feb 2024 04:52), Max: 98 (24 Feb 2024 12:20)  HR: 63 (25 Feb 2024 04:52) (63 - 72)  BP: 169/74 (25 Feb 2024 04:52) (164/75 - 169/74)  BP(mean): --  RR: 19 (25 Feb 2024 04:52) (18 - 20)  SpO2: 94% (25 Feb 2024 04:52) (93% - 98%)    Parameters below as of 25 Feb 2024 04:52  Patient On (Oxygen Delivery Method): nasal cannula  O2 Flow (L/min): 2      PHYSICAL EXAM:  GENERAL: NAD  HEENT: EOMI, hearing normal, conjunctiva and sclera clear, chronic raspy voice  Chest: CTA bilaterally, no wheezing  CV: S1S2, RRR,   GI: soft, +BS, NT/ND, +PEG  Musculoskeletal: no LE edema  Psychiatric: affect nL, mood nL  Skin: warm and dry    LABS:                        10.7   8.57  )-----------( 151      ( 24 Feb 2024 06:00 )             33.7     02-24    141  |  97  |  28<H>  ----------------------------<  109<H>  3.5   |  36<H>  |  1.40<H>    Ca    9.5      24 Feb 2024 06:00    TPro  6.8  /  Alb  3.6  /  TBili  0.4  /  DBili  x   /  AST  10<L>  /  ALT  14  /  AlkPhos  72  02-24    PT/INR - ( 23 Feb 2024 22:10 )   PT: 10.7 sec;   INR: 0.91 ratio         PTT - ( 24 Feb 2024 06:00 )  PTT:30.4 sec  Urinalysis Basic - ( 24 Feb 2024 06:00 )    Color: x / Appearance: x / SG: x / pH: x  Gluc: 109 mg/dL / Ketone: x  / Bili: x / Urobili: x   Blood: x / Protein: x / Nitrite: x   Leuk Esterase: x / RBC: x / WBC x   Sq Epi: x / Non Sq Epi: x / Bacteria: x        
CHIEF COMPLAINT/INTERVAL HISTORY:  Pt. seen and evaluated for syncope.  Pt. is in no distress.  She is awake and responsive.  Denies having lightheaded, CP, or SOB.      REVIEW OF SYSTEMS:  No fever, CP, SOB, or abdominal pain    Vital Signs Last 24 Hrs  T(C): 36.6 (24 Feb 2024 04:53), Max: 36.7 (23 Feb 2024 18:19)  T(F): 97.8 (24 Feb 2024 04:53), Max: 98.1 (23 Feb 2024 18:19)  HR: 72 (24 Feb 2024 04:53) (63 - 72)  BP: 168/83 (24 Feb 2024 04:53) (99/63 - 169/69)  BP(mean): --  RR: 18 (24 Feb 2024 04:53) (18 - 19)  SpO2: 99% (24 Feb 2024 04:53) (95% - 99%)    Parameters below as of 24 Feb 2024 04:53  Patient On (Oxygen Delivery Method): nasal cannula        PHYSICAL EXAM:  GENERAL: NAD  HEENT: EOMI, hearing normal, conjunctiva and sclera clear, chronic raspy voice,   Chest: CTA bilaterally, no wheezing  CV: S1S2, RRR,   GI: soft, +BS, NT/ND, +PEG  Musculoskeletal: no LE edema  Neuro: CN II-XII intact, chronic weakness of left UE and bilateral LE  Psychiatric: affect nL, mood nL  Skin: warm and dry    LABS:                        10.7   8.57  )-----------( 151      ( 24 Feb 2024 06:00 )             33.7     02-24    141  |  97  |  28<H>  ----------------------------<  109<H>  3.5   |  36<H>  |  1.40<H>    Ca    9.5      24 Feb 2024 06:00    TPro  6.8  /  Alb  3.6  /  TBili  0.4  /  DBili  x   /  AST  10<L>  /  ALT  14  /  AlkPhos  72  02-24    PT/INR - ( 23 Feb 2024 22:10 )   PT: 10.7 sec;   INR: 0.91 ratio         PTT - ( 24 Feb 2024 06:00 )  PTT:30.4 sec  Urinalysis Basic - ( 24 Feb 2024 06:00 )    Color: x / Appearance: x / SG: x / pH: x  Gluc: 109 mg/dL / Ketone: x  / Bili: x / Urobili: x   Blood: x / Protein: x / Nitrite: x   Leuk Esterase: x / RBC: x / WBC x   Sq Epi: x / Non Sq Epi: x / Bacteria: x        
Patient is a 78y old  Female who presents with a chief complaint of syncope (24 Feb 2024 08:14)       HPI:  79 y/o F with PMHx CVA (residual L-sided weakness, on Plavix), ESRD (MWF), HTN, Type 2 DM (on insulin), dysphagia (PEG tube), dementia sent from dialysis center to ED for syncope. Patient completed HD session today during which 3L of fluid were removed, per patient. As per the , patient passed out while in the reclining chair. Patient is unsure how long she was unconscious for, but awoke to EMS around her in the center. This has only happened once or twice before after HD. Denied any prodromal symptoms. No seizure-like activity reported while unconscious. Currently, feels tired, but otherwise denies CP, SOB, headache, dizziness, palpitations Denies recent illness. Some household members are sick currently with URI symptoms. She denies fever, cough, sore throat.     Renal consulted for ESRD. HD on MWF at [facility] dialysis with Dr. Prather    No N/V.  SOB stable    ED course  Vitals: 98.1 F , HR 68, BP 99/63 --> 127/70, RR 18, SpO2 95% 2  Labs: Hgb 10.1, CO2 36, BUN 24  CXR: elevated L hemidiaphragm, but no other cardiopulmonary disease (personal read)   (23 Feb 2024 23:34)       PAST MEDICAL & SURGICAL HISTORY:  ESRD (end stage renal disease) on dialysis      Hypertension      CVA (cerebrovascular accident)      Dementia      Feeding by G-tube      H/O Spinal surgery      History of appendectomy           FAMILY HISTORY:  FHx: breast cancer (Sibling)    [place]    Social History:Non smoker    MEDICATIONS  (STANDING):  amLODIPine   Tablet 2.5 milliGRAM(s) Oral daily  cholecalciferol 1000 Unit(s) Oral daily  clopidogrel Tablet 75 milliGRAM(s) Enteral Tube daily  dextrose 5%. 1000 milliLiter(s) (50 mL/Hr) IV Continuous <Continuous>  dextrose 5%. 1000 milliLiter(s) (100 mL/Hr) IV Continuous <Continuous>  dextrose 50% Injectable 25 Gram(s) IV Push once  dextrose 50% Injectable 12.5 Gram(s) IV Push once  dextrose 50% Injectable 25 Gram(s) IV Push once  diazepam    Tablet 5 milliGRAM(s) Oral two times a day  famotidine    Tablet 20 milliGRAM(s) Oral daily  glucagon  Injectable 1 milliGRAM(s) IntraMuscular once  heparin   Injectable 5000 Unit(s) SubCutaneous every 12 hours  insulin lispro (ADMELOG) corrective regimen sliding scale   SubCutaneous every 6 hours  QUEtiapine 50 milliGRAM(s) Oral daily  sertraline 100 milliGRAM(s) Oral two times a day    MEDICATIONS  (PRN):  acetaminophen     Tablet .. 650 milliGRAM(s) Oral every 6 hours PRN Temp greater or equal to 38C (100.4F), Mild Pain (1 - 3)  aluminum hydroxide/magnesium hydroxide/simethicone Suspension 30 milliLiter(s) Oral every 4 hours PRN Dyspepsia  dextrose Oral Gel 15 Gram(s) Oral once PRN Blood Glucose LESS THAN 70 milliGRAM(s)/deciliter  melatonin 3 milliGRAM(s) Oral at bedtime PRN Insomnia  ondansetron Injectable 4 milliGRAM(s) IV Push every 8 hours PRN Nausea and/or Vomiting   Meds reviewed    Allergies    latex (Unknown)  NIFEdipine (Unknown)  Cherries (Short breath; Hives)  sulfa drugs (Hives; Rash)    Intolerances         REVIEW OF SYSTEMS: As per HPI, otherwise negative     ICU Vital Signs Last 24 Hrs  T(C): 36.3 (26 Feb 2024 19:59), Max: 36.7 (26 Feb 2024 16:50)  T(F): 97.3 (26 Feb 2024 19:59), Max: 98 (26 Feb 2024 16:50)  HR: 67 (26 Feb 2024 19:59) (59 - 71)  BP: 170/69 (26 Feb 2024 19:59) (118/61 - 199/71)  BP(mean): --  ABP: --  ABP(mean): --  RR: 18 (26 Feb 2024 19:59) (18 - 18)  SpO2: 100% (26 Feb 2024 19:59) (95% - 100%)    O2 Parameters below as of 26 Feb 2024 19:59  Patient On (Oxygen Delivery Method): nasal cannula  O2 Flow (L/min): 2            PHYSICAL EXAM:    GENERAL: NAD  HEAD:  Atraumatic, Normocephalic  EYES: EOMI, conjunctiva and sclera clear  ENMT: No Drainage from nares, No drainage from ears  NECK: Supple, neck  veins full  NERVOUS SYSTEM:  Awake and Alert  CHEST/LUNG: Clear to percussion bilaterally; No rales, rhonchi, wheezing, or rubs  HEART: Regular rate and rhythm; No murmurs, rubs, or gallops  ABDOMEN: Soft, Nontender, Nondistended; Bowel sounds present  EXTREMITIES:  No Edema  SKIN: No rashes No obvious ecchymosis      LABS:                                       9.9    6.90  )-----------( 160      ( 26 Feb 2024 14:30 )             30.7     02-26    137  |  98  |  47<H>  ----------------------------<  140<H>  3.7   |  33<H>  |  1.70<H>    Ca    9.2      26 Feb 2024 14:30        Urinalysis Basic - ( 26 Feb 2024 14:30 )    Color: x / Appearance: x / SG: x / pH: x  Gluc: 140 mg/dL / Ketone: x  / Bili: x / Urobili: x   Blood: x / Protein: x / Nitrite: x   Leuk Esterase: x / RBC: x / WBC x   Sq Epi: x / Non Sq Epi: x / Bacteria: x          
Patient is a 78y old  Female who presents with a chief complaint of syncope (24 Feb 2024 08:14)       HPI:  79 y/o F with PMHx CVA (residual L-sided weakness, on Plavix), ESRD (MWF), HTN, Type 2 DM (on insulin), dysphagia (PEG tube), dementia sent from dialysis center to ED for syncope. Patient completed HD session today during which 3L of fluid were removed, per patient. As per the , patient passed out while in the reclining chair. Patient is unsure how long she was unconscious for, but awoke to EMS around her in the center. This has only happened once or twice before after HD. Denied any prodromal symptoms. No seizure-like activity reported while unconscious. Currently, feels tired, but otherwise denies CP, SOB, headache, dizziness, palpitations Denies recent illness. Some household members are sick currently with URI symptoms. She denies fever, cough, sore throat.     Renal consulted for ESRD. HD on MWF at [facility] dialysis with Dr. Prather    ED course  Vitals: 98.1 F , HR 68, BP 99/63 --> 127/70, RR 18, SpO2 95% 2  Labs: Hgb 10.1, CO2 36, BUN 24  CXR: elevated L hemidiaphragm, but no other cardiopulmonary disease (personal read)   (23 Feb 2024 23:34)       PAST MEDICAL & SURGICAL HISTORY:  ESRD (end stage renal disease) on dialysis      Hypertension      CVA (cerebrovascular accident)      Dementia      Feeding by G-tube      H/O Spinal surgery      History of appendectomy           FAMILY HISTORY:  FHx: breast cancer (Sibling)    [place]    Social History:Non smoker    MEDICATIONS  (STANDING):  amLODIPine   Tablet 2.5 milliGRAM(s) Oral daily  cholecalciferol 1000 Unit(s) Oral daily  clopidogrel Tablet 75 milliGRAM(s) Enteral Tube daily  dextrose 5%. 1000 milliLiter(s) (50 mL/Hr) IV Continuous <Continuous>  dextrose 5%. 1000 milliLiter(s) (100 mL/Hr) IV Continuous <Continuous>  dextrose 50% Injectable 25 Gram(s) IV Push once  dextrose 50% Injectable 12.5 Gram(s) IV Push once  dextrose 50% Injectable 25 Gram(s) IV Push once  diazepam    Tablet 5 milliGRAM(s) Oral two times a day  famotidine    Tablet 20 milliGRAM(s) Oral daily  glucagon  Injectable 1 milliGRAM(s) IntraMuscular once  heparin   Injectable 5000 Unit(s) SubCutaneous every 12 hours  insulin lispro (ADMELOG) corrective regimen sliding scale   SubCutaneous every 6 hours  QUEtiapine 50 milliGRAM(s) Oral daily  sertraline 100 milliGRAM(s) Oral two times a day    MEDICATIONS  (PRN):  acetaminophen     Tablet .. 650 milliGRAM(s) Oral every 6 hours PRN Temp greater or equal to 38C (100.4F), Mild Pain (1 - 3)  aluminum hydroxide/magnesium hydroxide/simethicone Suspension 30 milliLiter(s) Oral every 4 hours PRN Dyspepsia  dextrose Oral Gel 15 Gram(s) Oral once PRN Blood Glucose LESS THAN 70 milliGRAM(s)/deciliter  melatonin 3 milliGRAM(s) Oral at bedtime PRN Insomnia  ondansetron Injectable 4 milliGRAM(s) IV Push every 8 hours PRN Nausea and/or Vomiting   Meds reviewed    Allergies    latex (Unknown)  NIFEdipine (Unknown)  Cherries (Short breath; Hives)  sulfa drugs (Hives; Rash)    Intolerances         REVIEW OF SYSTEMS: As per HPI, otherwise negative     Vital Signs Last 24 Hrs  T(C): 36.3 (02-25-24 @ 04:52), Max: 36.7 (02-24-24 @ 12:20)  T(F): 97.3 (02-25-24 @ 04:52), Max: 98 (02-24-24 @ 12:20)  HR: 63 (02-25-24 @ 04:52) (63 - 72)  BP: 169/74 (02-25-24 @ 04:52) (164/75 - 169/74)  BP(mean): --  RR: 19 (02-25-24 @ 04:52) (18 - 20)  SpO2: 94% (02-25-24 @ 04:52) (93% - 98%)    Orthostatic VS  02-24-24 @ 12:20  Lying BP: 136/71 HR: 68  Sitting BP: 138/73 HR: 68  Standing BP: --/-- HR: --  Site: --  Mode: --      PHYSICAL EXAM:    GENERAL: NAD  HEAD:  Atraumatic, Normocephalic  EYES: EOMI, conjunctiva and sclera clear  ENMT: No Drainage from nares, No drainage from ears  NECK: Supple, neck  veins full  NERVOUS SYSTEM:  Awake and Alert  CHEST/LUNG: Clear to percussion bilaterally; No rales, rhonchi, wheezing, or rubs  HEART: Regular rate and rhythm; No murmurs, rubs, or gallops  ABDOMEN: Soft, Nontender, Nondistended; Bowel sounds present  EXTREMITIES:  No Edema  SKIN: No rashes No obvious ecchymosis      LABS:                        10.7   8.57  )-----------( 151      ( 24 Feb 2024 06:00 )             33.7     02-24    141  |  97  |  28<H>  ----------------------------<  109<H>  3.5   |  36<H>  |  1.40<H>    Ca    9.5      24 Feb 2024 06:00    TPro  6.8  /  Alb  3.6  /  TBili  0.4  /  DBili  x   /  AST  10<L>  /  ALT  14  /  AlkPhos  72  02-24    PT/INR - ( 23 Feb 2024 22:10 )   PT: 10.7 sec;   INR: 0.91 ratio         PTT - ( 24 Feb 2024 06:00 )  PTT:30.4 sec  Urinalysis Basic - ( 24 Feb 2024 06:00 )    Color: x / Appearance: x / SG: x / pH: x  Gluc: 109 mg/dL / Ketone: x  / Bili: x / Urobili: x   Blood: x / Protein: x / Nitrite: x   Leuk Esterase: x / RBC: x / WBC x   Sq Epi: x / Non Sq Epi: x / Bacteria: x                  RADIOLOGY & ADDITIONAL TESTS:  
CHIEF COMPLAINT/INTERVAL HISTORY:  Pt. seen and evaluated for syncope.  Pt. is in no distress.  Denies having CP or SOB.  Noted elevated SBP in 170's.      REVIEW OF SYSTEMS:  No fever, CP, SOB, or abdominal pain    Vital Signs Last 24 Hrs  T(C): 36.3 (26 Feb 2024 04:35), Max: 36.5 (25 Feb 2024 20:38)  T(F): 97.4 (26 Feb 2024 04:35), Max: 97.7 (25 Feb 2024 20:38)  HR: 60 (26 Feb 2024 04:35) (60 - 74)  BP: 179/79 (26 Feb 2024 04:35) (110/68 - 179/79)  BP(mean): --  RR: 18 (26 Feb 2024 04:35) (18 - 19)  SpO2: 99% (26 Feb 2024 04:35) (92% - 99%)    Parameters below as of 26 Feb 2024 04:35  Patient On (Oxygen Delivery Method): nasal cannula  O2 Flow (L/min): 2      PHYSICAL EXAM:  GENERAL: NAD  HEENT: EOMI, hearing normal, conjunctiva and sclera clear, chronic raspy voice  Chest: CTA bilaterally, no wheezing  CV: S1S2, RRR,   GI: soft, +BS, NT/ND, +PEG  Musculoskeletal: no LE edema  Psychiatric: affect nL, mood nL  Skin: warm and dry                    
Patient is a 78y old  Female who presents with a chief complaint of syncope (24 Feb 2024 08:14)       HPI:  77 y/o F with PMHx CVA (residual L-sided weakness, on Plavix), ESRD (MWF), HTN, Type 2 DM (on insulin), dysphagia (PEG tube), dementia sent from dialysis center to ED for syncope. Patient completed HD session today during which 3L of fluid were removed, per patient. As per the , patient passed out while in the reclining chair. Patient is unsure how long she was unconscious for, but awoke to EMS around her in the center. This has only happened once or twice before after HD. Denied any prodromal symptoms. No seizure-like activity reported while unconscious. Currently, feels tired, but otherwise denies CP, SOB, headache, dizziness, palpitations Denies recent illness. Some household members are sick currently with URI symptoms. She denies fever, cough, sore throat.     Renal consulted for ESRD. HD on MWF at [facility] dialysis with Dr. Prather    No N/V.  SOB stable    ED course  Vitals: 98.1 F , HR 68, BP 99/63 --> 127/70, RR 18, SpO2 95% 2  Labs: Hgb 10.1, CO2 36, BUN 24  CXR: elevated L hemidiaphragm, but no other cardiopulmonary disease (personal read)   (23 Feb 2024 23:34)       PAST MEDICAL & SURGICAL HISTORY:  ESRD (end stage renal disease) on dialysis      Hypertension      CVA (cerebrovascular accident)      Dementia      Feeding by G-tube      H/O Spinal surgery      History of appendectomy           FAMILY HISTORY:  FHx: breast cancer (Sibling)    [place]    Social History:Non smoker    MEDICATIONS  (STANDING):  amLODIPine   Tablet 2.5 milliGRAM(s) Oral daily  cholecalciferol 1000 Unit(s) Oral daily  clopidogrel Tablet 75 milliGRAM(s) Enteral Tube daily  dextrose 5%. 1000 milliLiter(s) (50 mL/Hr) IV Continuous <Continuous>  dextrose 5%. 1000 milliLiter(s) (100 mL/Hr) IV Continuous <Continuous>  dextrose 50% Injectable 25 Gram(s) IV Push once  dextrose 50% Injectable 12.5 Gram(s) IV Push once  dextrose 50% Injectable 25 Gram(s) IV Push once  diazepam    Tablet 5 milliGRAM(s) Oral two times a day  famotidine    Tablet 20 milliGRAM(s) Oral daily  glucagon  Injectable 1 milliGRAM(s) IntraMuscular once  heparin   Injectable 5000 Unit(s) SubCutaneous every 12 hours  insulin lispro (ADMELOG) corrective regimen sliding scale   SubCutaneous every 6 hours  QUEtiapine 50 milliGRAM(s) Oral daily  sertraline 100 milliGRAM(s) Oral two times a day    MEDICATIONS  (PRN):  acetaminophen     Tablet .. 650 milliGRAM(s) Oral every 6 hours PRN Temp greater or equal to 38C (100.4F), Mild Pain (1 - 3)  aluminum hydroxide/magnesium hydroxide/simethicone Suspension 30 milliLiter(s) Oral every 4 hours PRN Dyspepsia  dextrose Oral Gel 15 Gram(s) Oral once PRN Blood Glucose LESS THAN 70 milliGRAM(s)/deciliter  melatonin 3 milliGRAM(s) Oral at bedtime PRN Insomnia  ondansetron Injectable 4 milliGRAM(s) IV Push every 8 hours PRN Nausea and/or Vomiting   Meds reviewed    Allergies    latex (Unknown)  NIFEdipine (Unknown)  Cherries (Short breath; Hives)  sulfa drugs (Hives; Rash)    Intolerances         REVIEW OF SYSTEMS: As per HPI, otherwise negative     Vital Signs Last 24 Hrs  T(C): 36.6 (28 Feb 2024 10:50), Max: 36.6 (28 Feb 2024 04:45)  T(F): 97.9 (28 Feb 2024 10:50), Max: 97.9 (28 Feb 2024 10:50)  HR: 69 (28 Feb 2024 10:55) (63 - 75)  BP: 160/73 (28 Feb 2024 10:55) (124/73 - 165/76)  BP(mean): --  RR: 18 (28 Feb 2024 10:50) (17 - 18)  SpO2: 100% (28 Feb 2024 10:50) (98% - 100%)    Parameters below as of 28 Feb 2024 10:50  Patient On (Oxygen Delivery Method): nasal cannula  O2 Flow (L/min): 2      PHYSICAL EXAM:    GENERAL: NAD  HEAD:  Atraumatic, Normocephalic  NERVOUS SYSTEM:  Awake and Alert, confused  CHEST/LUNG: Clear to auscultation bilaterally; No rales, rhonchi, wheezing, or rubs  HEART: Regular rate and rhythm; No murmurs, rubs, or gallops  ABDOMEN: Soft, Nontender, Nondistended; Bowel sounds present  EXTREMITIES:  No Edema        LABS:                                             9.7    8.71  )-----------( 176      ( 28 Feb 2024 09:15 )             31.1     02-28    136  |  97  |  55<H>  ----------------------------<  208<H>  4.1   |  32<H>  |  2.40<H>    Ca    9.6      28 Feb 2024 09:15  Phos  2.3     02-28  Mg     2.6     02-28        Urinalysis Basic - ( 28 Feb 2024 09:15 )    Color: x / Appearance: x / SG: x / pH: x  Gluc: 208 mg/dL / Ketone: x  / Bili: x / Urobili: x   Blood: x / Protein: x / Nitrite: x   Leuk Esterase: x / RBC: x / WBC x   Sq Epi: x / Non Sq Epi: x / Bacteria: x                    
Patient is a 78y old  Female who presents with a chief complaint of syncope (24 Feb 2024 08:14)       HPI:  77 y/o F with PMHx CVA (residual L-sided weakness, on Plavix), ESRD (MWF), HTN, Type 2 DM (on insulin), dysphagia (PEG tube), dementia sent from dialysis center to ED for syncope. Patient completed HD session today during which 3L of fluid were removed, per patient. As per the , patient passed out while in the reclining chair. Patient is unsure how long she was unconscious for, but awoke to EMS around her in the center. This has only happened once or twice before after HD. Denied any prodromal symptoms. No seizure-like activity reported while unconscious. Currently, feels tired, but otherwise denies CP, SOB, headache, dizziness, palpitations Denies recent illness. Some household members are sick currently with URI symptoms. She denies fever, cough, sore throat.     Renal consulted for ESRD. HD on MWF at [facility] dialysis with Dr. Prather    No N/V.  SOB stable    ED course  Vitals: 98.1 F , HR 68, BP 99/63 --> 127/70, RR 18, SpO2 95% 2  Labs: Hgb 10.1, CO2 36, BUN 24  CXR: elevated L hemidiaphragm, but no other cardiopulmonary disease (personal read)   (23 Feb 2024 23:34)       PAST MEDICAL & SURGICAL HISTORY:  ESRD (end stage renal disease) on dialysis      Hypertension      CVA (cerebrovascular accident)      Dementia      Feeding by G-tube      H/O Spinal surgery      History of appendectomy           FAMILY HISTORY:  FHx: breast cancer (Sibling)    [place]    Social History:Non smoker    MEDICATIONS  (STANDING):  amLODIPine   Tablet 2.5 milliGRAM(s) Oral daily  cholecalciferol 1000 Unit(s) Oral daily  clopidogrel Tablet 75 milliGRAM(s) Enteral Tube daily  dextrose 5%. 1000 milliLiter(s) (50 mL/Hr) IV Continuous <Continuous>  dextrose 5%. 1000 milliLiter(s) (100 mL/Hr) IV Continuous <Continuous>  dextrose 50% Injectable 25 Gram(s) IV Push once  dextrose 50% Injectable 12.5 Gram(s) IV Push once  dextrose 50% Injectable 25 Gram(s) IV Push once  diazepam    Tablet 5 milliGRAM(s) Oral two times a day  famotidine    Tablet 20 milliGRAM(s) Oral daily  glucagon  Injectable 1 milliGRAM(s) IntraMuscular once  heparin   Injectable 5000 Unit(s) SubCutaneous every 12 hours  insulin lispro (ADMELOG) corrective regimen sliding scale   SubCutaneous every 6 hours  QUEtiapine 50 milliGRAM(s) Oral daily  sertraline 100 milliGRAM(s) Oral two times a day    MEDICATIONS  (PRN):  acetaminophen     Tablet .. 650 milliGRAM(s) Oral every 6 hours PRN Temp greater or equal to 38C (100.4F), Mild Pain (1 - 3)  aluminum hydroxide/magnesium hydroxide/simethicone Suspension 30 milliLiter(s) Oral every 4 hours PRN Dyspepsia  dextrose Oral Gel 15 Gram(s) Oral once PRN Blood Glucose LESS THAN 70 milliGRAM(s)/deciliter  melatonin 3 milliGRAM(s) Oral at bedtime PRN Insomnia  ondansetron Injectable 4 milliGRAM(s) IV Push every 8 hours PRN Nausea and/or Vomiting   Meds reviewed    Allergies    latex (Unknown)  NIFEdipine (Unknown)  Cherries (Short breath; Hives)  sulfa drugs (Hives; Rash)    Intolerances         REVIEW OF SYSTEMS: As per HPI, otherwise negative     Vital Signs Last 24 Hrs  T(C): 36.4 (27 Feb 2024 04:37), Max: 36.7 (26 Feb 2024 16:50)  T(F): 97.6 (27 Feb 2024 04:37), Max: 98 (26 Feb 2024 16:50)  HR: 59 (27 Feb 2024 04:37) (59 - 71)  BP: 155/- (27 Feb 2024 04:37) (118/61 - 199/71)  BP(mean): --  RR: 18 (27 Feb 2024 04:37) (18 - 18)  SpO2: 98% (27 Feb 2024 04:37) (95% - 100%)    Parameters below as of 27 Feb 2024 04:37  Patient On (Oxygen Delivery Method): nasal cannula          PHYSICAL EXAM:    GENERAL: NAD  HEAD:  Atraumatic, Normocephalic  EYES: EOMI, conjunctiva and sclera clear  ENMT: No Drainage from nares, No drainage from ears  NECK: Supple, neck  veins full  NERVOUS SYSTEM:  Awake and Alert  CHEST/LUNG: Clear to percussion bilaterally; No rales, rhonchi, wheezing, or rubs  HEART: Regular rate and rhythm; No murmurs, rubs, or gallops  ABDOMEN: Soft, Nontender, Nondistended; Bowel sounds present  EXTREMITIES:  No Edema  SKIN: No rashes No obvious ecchymosis      LABS:                        9.9    6.90  )-----------( 160      ( 26 Feb 2024 14:30 )             30.7     02-26    137  |  98  |  47<H>  ----------------------------<  140<H>  3.7   |  33<H>  |  1.70<H>    Ca    9.2      26 Feb 2024 14:30        Urinalysis Basic - ( 26 Feb 2024 14:30 )    Color: x / Appearance: x / SG: x / pH: x  Gluc: 140 mg/dL / Ketone: x  / Bili: x / Urobili: x   Blood: x / Protein: x / Nitrite: x   Leuk Esterase: x / RBC: x / WBC x   Sq Epi: x / Non Sq Epi: x / Bacteria: x

## 2024-02-28 NOTE — SOCIAL WORK PROGRESS NOTE - NSSWPROGRESSNOTE_GEN_ALL_CORE
Spoke to Liz from [facility]; informed her pt resuming dialysis Friday 3/1/24. Pt's scheduled days and times for dialysis is M, W, F 2:00pm. Faxed clinicals to F#[phone]. SW will remain available as needed.

## 2024-02-28 NOTE — DISCHARGE NOTE NURSING/CASE MANAGEMENT/SOCIAL WORK - NSDCPEFALRISK_GEN_ALL_CORE
For information on Fall & Injury Prevention, visit: [email or web address] OR  [email or web address] OR  https://www.cdc.gov/steadi/patient.html

## 2024-02-28 NOTE — DISCHARGE NOTE NURSING/CASE MANAGEMENT/SOCIAL WORK - NSDCFUADDAPPT_GEN_ALL_CORE_FT
Social work has contacted [facility] to reinstate your dialysis. You are scheduled to resume dialysis services for Monday, Wednesday, Friday at 2:00pm. If you have any questions for [facility], you can contact them at P#[phone].

## 2024-02-28 NOTE — CHART NOTE - NSCHARTNOTEFT_GEN_A_CORE
Patient seen and examined on day of discharge. Seen in dialysis suite tolerating HD. Confused at baseline. No distress and has no complaints. Plan is for discharge home after HD. Has 5PM . BP meds have been adjusted. Called and updated son Jono re: medication changes and plan for discharge. D/w SW/CM as well. Patient is stable for discharge home. Son is in agreement with plan.    Please see updated discharge note for further details.    Time Spent: 42 minutes Patient seen and examined on day of discharge. Seen in dialysis suite tolerating HD. Confused at baseline. No distress and has no complaints. Plan is for discharge home after HD. Has 5PM . BP meds have been adjusted. Called and updated son Jono re: medication changes and plan for discharge. D/w SW/CM as well. Patient is stable for discharge home. Son is in agreement with plan. Discussed with nephrology Dr. Steward as well. Patient to f/u with her primary nephrologist.    Please see updated discharge note for further details.    Time Spent: 42 minutes

## 2024-02-28 NOTE — PROGRESS NOTE ADULT - ASSESSMENT
77 y/o F with PMHx CVA (residual L-sided weakness, on Plavix), ESRD (MWF), HTN, Type 2 DM (on insulin), dysphagia (PEG tube), dementia sent from dialysis center to ED for syncope. Admitted for syncope.        Problem/Plan - 1:  ·  Problem: Syncope.   ·  Plan: Syncope post-HD, unknown downtime. Now back to baseline mentation. Likely vasovagal vs. hypovolemic 2/2 HD  - H/o CVA, on Plavix  - No concern for TIA/CVA at this time. Cont Plavix. Consider imaging if mental status changes  - Pt reports 3L fluid removed today  - Mildly hypotensive on admission, BP improved without intervention  - Check echo  - negative orthostatic BP (supine to sitting) Pt. can not stand since her past CVA.    - Telemetry monitoring to r/o arrhythmia.     Problem/Plan - 2:  ·  Problem: ESRD on dialysis.   ·  Plan: [facility] dialysis schedule  - [facility]  - SW/CM consulted to re-establish dialysis schedule and coordinate with 24hr [facility]  - Nephro (Dr. Kong) consulted.     Problem/Plan - 3:  ·  Problem: Type 2 diabetes mellitus.   ·  Plan: On home insulin sliding scale  - A1C: 5.4  - Low ISS  - Regular finger sticks  - Cont Tube Feeds. Nutrition consult   - Hypoglycemic protocol.     Problem/Plan - 4:  ·  Problem: Dementia.   ·  Plan: Cont home Quetiapine, Sertraline, Diazepam.     Problem/Plan - 5:  ·  Problem: HTN (hypertension).   ·  Plan: Cont home Amlodipine with hold parameters.     Problem/Plan - 6:  ·  Problem: Need for prophylactic measure.   ·  Plan: Cont Heparin prophylaxis.          
77 y/o F with PMHx CVA (residual L-sided weakness, on Plavix), ESRD (MWF), HTN, Type 2 DM (on insulin), dysphagia (PEG tube), dementia sent from dialysis center to ED for syncope. Admitted for syncope.        Problem/Plan - 1:  ·  Problem: Syncope.   ·  Plan: Syncope post-HD, unknown downtime. Now back to baseline mentation. Likely vasovagal vs. hypovolemic 2/2 HD  - H/o CVA, on Plavix  - No concern for TIA/CVA at this time. Cont Plavix. Consider imaging if mental status changes  - Pt reports 3L fluid removed today  - Mildly hypotensive on admission, BP improved without intervention  - Check echo  - negative orthostatic BP (supine to sitting) Pt. can not stand since her past CVA.    - Telemetry monitoring to r/o arrhythmia.     Problem/Plan - 2:  ·  Problem: ESRD on dialysis.   ·  Plan: [facility] dialysis schedule  - [facility]  - SW/CM consulted to re-establish dialysis schedule and coordinate with 24hr [facility]  - Pt. to have HD session today  - Nephro (Dr. Kong) consulted.     Problem/Plan - 3:  ·  Problem: Type 2 diabetes mellitus.   ·  Plan: On home insulin sliding scale  - A1C: 5.4  - Low ISS  - Regular finger sticks  - Cont Tube Feeds. Nutrition consult   - Hypoglycemic protocol.     Problem/Plan - 4:  ·  Problem: Dementia.   ·  Plan: Cont home Quetiapine, Sertraline, Diazepam.     Problem/Plan - 5:  ·  Problem: HTN (hypertension).   ·  Plan: Has been hypertensive with SBP in 170's.  Will change Norvasc to 5mg PO daily.  Hydralazine 10mg IV Q6h PRN for SBP>180     Problem/Plan - 6:  ·  Problem: Need for prophylactic measure.   ·  Plan: Cont Heparin prophylaxis.        
79 y/o F with PMHx CVA (residual L-sided weakness, on Plavix), ESRD (MWF), HTN, Type 2 DM (on insulin), dysphagia (PEG tube), dementia sent from dialysis center to ED for syncope. Admitted for syncope.        Problem/Plan - 1:  ·  Problem: Syncope.   ·  Plan: Syncope post-HD, unknown downtime. Now back to baseline mentation. Likely vasovagal vs. hypovolemic 2/2 HD  - H/o CVA, on Plavix  - No concern for TIA/CVA at this time. Cont Plavix. Consider imaging if mental status changes  - Pt reports 3L fluid removed today  - Mildly hypotensive on admission, BP improved without intervention  - Check echo  - negative orthostatic BP (supine to sitting) Pt. can not stand since her past CVA.    - Telemetry monitoring to r/o arrhythmia.     Problem/Plan - 2:  ·  Problem: ESRD on dialysis.   ·  Plan: [facility] dialysis schedule  - [facility]  - SW/CM consulted to re-establish dialysis schedule and coordinate with 24hr [facility]  - Pt. to have HD session today  - Nephro (Dr. Kong) consulted.     Problem/Plan - 3:  ·  Problem: Type 2 diabetes mellitus.   ·  Plan: On home insulin sliding scale  - A1C: 5.4  - Low ISS  - Regular finger sticks  - Cont Tube Feeds. Nutrition consult   - Hypoglycemic protocol.     Problem/Plan - 4:  ·  Problem: Dementia.   ·  Plan: Cont home Quetiapine, Sertraline, Diazepam.     Problem/Plan - 5:  ·  Problem: HTN (hypertension).   ·  Plan: Has been hypertensive with SBP in 170's.  Will change Norvasc to 5mg PO daily     Problem/Plan - 6:  ·  Problem: Need for prophylactic measure.   ·  Plan: Cont Heparin prophylaxis.    
ESRD  Syncope  HTN  Anemia  2HPTH  Dementia     -HD MWF; seen on dialysis today 2/28/24; tolerating  -Phos binders as needed  -Hgb goal 10-12  -Continue home BP medications if BP can tolerate  -Reduce UF goal at dialysis unit. Appear to be below ideal EDW  -Renal function also appear to be recovering? She follows with Dr. Mendez/Yamilka at [facility]. We will contact them  
77 y/o F with PMHx CVA (residual L-sided weakness, on Plavix), ESRD (MWF), HTN, Type 2 DM (on insulin), dysphagia (PEG tube), dementia sent from dialysis center to ED for syncope. Admitted for syncope.        Problem/Plan - 1:  ·  Problem: Syncope.   ·  Plan: Syncope post-HD today, unknown downtime. Now back to baseline mentation. Likely vasovagal vs. hypovolemic 2/2 HD  - H/o CVA, on Plavix  - No concern for TIA/CVA at this time. Cont Plavix. Consider imaging if mental status changes  - Pt reports 3L fluid removed today  - Mildly hypotensive on admission, BP improved without intervention  - Check echo and orthostatic BP  - Telemetry monitoring to r/o arrhythmia.     Problem/Plan - 2:  ·  Problem: ESRD on dialysis.   ·  Plan: [facility] dialysis schedule  - [facility]  - SW/CM consulted to re-establish dialysis schedule and coordinate with 24hr [facility]  - Nephro (Dr. Kong) consulted.     Problem/Plan - 3:  ·  Problem: Type 2 diabetes mellitus.   ·  Plan: On home insulin sliding scale  - A1C: pending  - Low ISS  - Regular finger sticks  - Cont Tube Feeds. Nutrition consult   - Hypoglycemic protocol.     Problem/Plan - 4:  ·  Problem: Dementia.   ·  Plan: Cont home Quetiapine, Sertraline, Diazepam.     Problem/Plan - 5:  ·  Problem: HTN (hypertension).   ·  Plan: Cont home Amlodipine with hold parameters.     Problem/Plan - 6:  ·  Problem: Need for prophylactic measure.   ·  Plan: Cont Heparin prophylaxis.    
ESRD  Syncope  HTN  Anemia  2HPTH  Dementia     -HD MWF  -Phos binders as needed  -Hgb goal 10-12  -Continue home BP medications if BP can tolerate  -Reduce UF goal at dialysis unit. Appear to be below ideal EDW  -Renal function also appear to be recovering? [facility] We will contact them  
ESRD  Syncope  HTN  Anemia  2HPTH  Dementia     -HD MWF  -Phos binders as needed  -Hgb goal 10-12  -Continue home BP medications if BP can tolerate  -Reduce UF goal at dialysis unit. Appear to be below ideal EDW  -Renal function also appear to be recovering? She follows with Dr. Mendez/Yamilka at [facility] dialysis. We will contact them  
ESRD  Syncope  HTN  Anemia  2HPTH  Dementia     -HD MWF. No indication for extra dialysis today  -Phos binders as needed  -Hgb goal 10-12  -Continue home BP medications if BP can tolerate  -Reduce UF goal at dialysis unit. Appear to be below ideal EDW  -Renal function also appear to be recovering? She follows with Dr. Mendez/Yamilka at [facility]. We will contact them Monday.

## 2024-04-01 NOTE — ED PROVIDER NOTE - CLINICAL SUMMARY MEDICAL DECISION MAKING FREE TEXT BOX
78-year-old female with history of ESRD on dialysis brought by EMS in full cardiac arrest.  Patient reportedly was found unresponsive in bed this morning by her aide.  EMS found patient to be pulseless and began full ACLS protocol including emergent endotracheal intubation.  CPR in progress upon arrival.    Full ACLS protocol carried out without successful resuscitation.  Pronounced dead at 9:01 AM.  Family present in ER.

## 2024-04-01 NOTE — ED PROVIDER NOTE - OBJECTIVE STATEMENT
78-year-old female with history of ESRD on dialysis brought by EMS in full cardiac arrest.  Patient reportedly was found unresponsive in bed this morning by her aide.  EMS found patient to be pulseless and began full ACLS protocol including emergent endotracheal intubation.  CPR in progress upon arrival.

## 2024-04-01 NOTE — ED ADULT NURSE NOTE - UNABLE TO OBTAIN
Unresponsive normal... Well appearing, well nourished, awake, alert, oriented to person, place, time/situation and in no apparent distress.

## 2024-04-01 NOTE — ED ADULT NURSE NOTE - OBJECTIVE STATEMENT
Pt BIBA unresponsive in cardiac arrest. Cardiac Code initiated immediately and and CPR started upon arrival to ED and placed on cardiac monitor. I/O noted to lower left extremity. See flow sheet for administered medication during Code.

## 2024-04-01 NOTE — ED PROVIDER NOTE - PROGRESS NOTE DETAILS
Full ACLS protocol carried without successful resuscitation.  Patient was asystolic the entire time.

## 2024-04-01 NOTE — ED ADULT TRIAGE NOTE - CHIEF COMPLAINT QUOTE
as per EMS  - " she found unresponsive No pulses CPr started right away " Pt seen last night awake . Pt presented with Et to assisted ventilation ( 7.5 at 23 lip line) ongoing CPR - ACLS continues at per

## 2024-04-01 NOTE — ED ADULT NURSE NOTE - NSFALLRISKINTERV_ED_ALL_ED

## 2024-04-02 ENCOUNTER — APPOINTMENT (OUTPATIENT)
Dept: OTOLARYNGOLOGY | Facility: CLINIC | Age: 79
End: 2024-04-02

## 2024-04-08 NOTE — PATIENT PROFILE ADULT - FALL HARM RISK - FALLEN IN PAST
Per visit note from , \"Patient to follow-up with another ultrasound in March.\"    Patient should be scheduled for US and OV with .    No

## 2024-05-20 NOTE — ED ADULT TRIAGE NOTE - BSA (M2)
Medication:  HYDROCHLOROTHIAZIDE 12.5 MG TB  passed protocol.   Last office visit date: 3/19/24  Next appointment scheduled?: Yes 9/19/24  Number of refills given: 1    
1.97

## 2024-08-12 NOTE — ED PROVIDER NOTE - TEMPLATE, MLM
Controlled with MiraLAX and senna  
Controlled with hydroxyzine and mirtazapine  
Controlled with scheduled Tylenol  
Majority of blood pressures under control.  Continue diltiazem, Imdur and lisinopril  
Progressive decline expected.  Continue supportive cares.  
General

## 2024-08-26 NOTE — ED ADULT TRIAGE NOTE - WEIGHT IN KG

## 2024-12-02 NOTE — DISCHARGE NOTE NURSING/CASE MANAGEMENT/SOCIAL WORK - NSDCPEPTCAREGIVEDUMATLIST _GEN_ALL_CORE
returned call rg rescheduling infuson    ----- Message from Saumya sent at 12/2/2024  1:13 PM CST -----  Contact: SCAR HOSKINS [[id number]]  ..Type:  Patient Requesting Call    Who Called: SCAR HOSKINS [[id number]]  Does the patient know what this is regarding?: pt needs to julia infusion  Would the patient rather a call back or a response via IBUonlinener?  call  Best Call Back Number: .[phone]  Additional Information:   Influenza Vaccination

## 2025-02-13 NOTE — PATIENT PROFILE ADULT - NSPROHMDIABETBLDGLCTST_GEN_A_NUR
Care Due:                  Date            Visit Type   Department     Provider  --------------------------------------------------------------------------------                                EP -                              PRIMARY      [facility] INTERNAL  Last Visit: 11-      [facility]ARE ([facility])   ANGELA Yepez                              EP -                              PRIMARY      [facility] INTERNAL  Next Visit: 05-      [facility] ([facility])   ANGELA Yepez                                                            Last  Test          Frequency    Reason                     Performed    Due Date  --------------------------------------------------------------------------------    HBA1C.......  6 months...  metFORMIN................  11- 05-    Lipid Panel.  12 months..  pravastatin..............  05- 05-    [facility] Embedded Care Due Messages. Reference number: [id number].   2/13/2025 6:00:53 AM CST   3 times/day

## (undated) DEVICE — KIT ENDO PROCEDURE CUST W/VLV

## (undated) DEVICE — FORCEP RADIAL JAW 4 W NDL 2.4MM 2.8MM 240CM ORANGE DISP

## (undated) DEVICE — TUBING CAP SET ERBEFLO CLEVERCAP HYBRID CO2 FOR OLYMPUS SCOPES AND UCR

## (undated) DEVICE — CONTAINER FORMALIN BUFF 10% 60ML

## (undated) DEVICE — SOL IRR POUR H2O 1000ML

## (undated) DEVICE — GLV 7.5 PROTEXIS (WHITE)